# Patient Record
Sex: FEMALE | Race: WHITE | NOT HISPANIC OR LATINO | Employment: OTHER | URBAN - METROPOLITAN AREA
[De-identification: names, ages, dates, MRNs, and addresses within clinical notes are randomized per-mention and may not be internally consistent; named-entity substitution may affect disease eponyms.]

---

## 2023-11-27 ENCOUNTER — OFFICE VISIT (OUTPATIENT)
Dept: FAMILY MEDICINE | Facility: CLINIC | Age: 78
End: 2023-11-27
Payer: COMMERCIAL

## 2023-11-27 VITALS
HEART RATE: 74 BPM | BODY MASS INDEX: 33.74 KG/M2 | HEIGHT: 67 IN | RESPIRATION RATE: 16 BRPM | OXYGEN SATURATION: 97 % | WEIGHT: 215 LBS | TEMPERATURE: 97.6 F | SYSTOLIC BLOOD PRESSURE: 123 MMHG | DIASTOLIC BLOOD PRESSURE: 75 MMHG

## 2023-11-27 DIAGNOSIS — R32 URINARY INCONTINENCE, UNSPECIFIED TYPE: ICD-10-CM

## 2023-11-27 DIAGNOSIS — R41.3 MEMORY CHANGE: ICD-10-CM

## 2023-11-27 DIAGNOSIS — E03.9 HYPOTHYROIDISM, UNSPECIFIED TYPE: Primary | ICD-10-CM

## 2023-11-27 LAB
T4 FREE SERPL-MCNC: 1.12 NG/DL (ref 0.9–1.7)
TSH SERPL DL<=0.005 MIU/L-ACNC: 11 UIU/ML (ref 0.3–4.2)

## 2023-11-27 PROCEDURE — 36415 COLL VENOUS BLD VENIPUNCTURE: CPT | Performed by: PHYSICIAN ASSISTANT

## 2023-11-27 PROCEDURE — 84439 ASSAY OF FREE THYROXINE: CPT | Performed by: PHYSICIAN ASSISTANT

## 2023-11-27 PROCEDURE — 84443 ASSAY THYROID STIM HORMONE: CPT | Performed by: PHYSICIAN ASSISTANT

## 2023-11-27 PROCEDURE — 99203 OFFICE O/P NEW LOW 30 MIN: CPT | Performed by: PHYSICIAN ASSISTANT

## 2023-11-27 RX ORDER — PREDNISOLONE ACETATE 10 MG/ML
1 SUSPENSION/ DROPS OPHTHALMIC DAILY
COMMUNITY
Start: 2023-05-31

## 2023-11-27 RX ORDER — LEVOTHYROXINE SODIUM 112 UG/1
112 TABLET ORAL DAILY
COMMUNITY
Start: 2023-08-08 | End: 2023-11-27

## 2023-11-27 RX ORDER — LEVOTHYROXINE SODIUM 112 UG/1
112 TABLET ORAL DAILY
Qty: 90 TABLET | Refills: 0 | Status: SHIPPED | OUTPATIENT
Start: 2023-11-27 | End: 2024-01-10

## 2023-11-27 RX ORDER — CYCLOSPORINE 0.5 MG/ML
1 EMULSION OPHTHALMIC 3 TIMES DAILY
COMMUNITY
Start: 2023-06-12

## 2023-11-27 RX ORDER — HYDROXYZINE HYDROCHLORIDE 10 MG/1
10 TABLET, FILM COATED ORAL EVERY 6 HOURS PRN
COMMUNITY
Start: 2022-07-11 | End: 2024-01-10

## 2023-11-27 ASSESSMENT — PATIENT HEALTH QUESTIONNAIRE - PHQ9
SUM OF ALL RESPONSES TO PHQ QUESTIONS 1-9: 3
10. IF YOU CHECKED OFF ANY PROBLEMS, HOW DIFFICULT HAVE THESE PROBLEMS MADE IT FOR YOU TO DO YOUR WORK, TAKE CARE OF THINGS AT HOME, OR GET ALONG WITH OTHER PEOPLE: NOT DIFFICULT AT ALL
SUM OF ALL RESPONSES TO PHQ QUESTIONS 1-9: 3

## 2023-11-27 ASSESSMENT — PAIN SCALES - GENERAL: PAINLEVEL: NO PAIN (0)

## 2023-11-27 NOTE — PROGRESS NOTES
"  Assessment & Plan     (E03.9) Hypothyroidism, unspecified type  (primary encounter diagnosis)  Comment: History of hypothyroidism.  No recent TSH levels checked within the last year.  Refills of levothyroxine provided.  Discussed potential medication titration based off results.  Plan: TSH with free T4 reflex, levothyroxine         (SYNTHROID/LEVOTHROID) 112 MCG tablet            (R41.3) Memory change  Comment: Has had memory change over the last year.  Mini cog score 1/5 here.  Discussed with patient following up with neurology.  Patient's sister notes increased change over the last year.  Patient does endorse difficulties with short-term memory.  Plan: Adult Neurology  Referral          (R32) Urinary incontinence, unspecified type  Comment: Urinary incontinence issues.  Uncertain duration of symptoms.  Complicated by the patient's short-term memory impairment.  Discussed with patient trial of exercises over the next month or so as well as referral for urology.  Patient has urinary urgency and is unable to get to the restroom before she has complete bladder emptying.  Denies any other urinary symptoms with this.   Plan: Adult Urology  Referral             BMI:   Estimated body mass index is 33.67 kg/m  as calculated from the following:    Height as of this encounter: 1.702 m (5' 7\").    Weight as of this encounter: 97.5 kg (215 lb).   Jeet Mcmahon PA-C  [facility]    Dat Jacinto is a 78 year old, presenting for the following health issues:  Establish Care and Recheck Medication    History of Present Illness       Reason for visit:  General informatio jjupdate    She eats 2-3 servings of fruits and vegetables daily.She consumes 2 sweetened beverage(s) daily.She exercises with enough effort to increase her heart rate 9 or less minutes per day.  She exercises with enough effort to increase her heart rate 3 or less days per week.   She is taking medications " "regularly.     Previously followed at [facility].  History of hypothyroidism.  On levothyroxine 112 mcg.  Denies any missed doses of this.      Did have [employer] evaluation in home and noted to have a 1 out of 5 mini cog screening.  Patient had repeat screening here with once again 1 out of 5 testing.  Patient with her sister.  Notes issues of short-term memory impairment that have been worsening since March to April of this year.      Mini-Cog Assessment:       Mini-Cog Assessment Score: 1/5.    History of urinary incontinence that has been going on for multiple years.  Multiple prior referrals for urology.  Patient is uncertain if she did follow-up with urology.  Does have urinary urgency with complete bladder emptying.  This happens twice per day as well as overnight.  Patient denies any dysuria or hematuria.  Denies any vaginal dryness.      Review of Systems   Constitutional, HEENT, cardiovascular, pulmonary, gi and gu systems are negative, except as otherwise noted.      Objective    /75   Pulse 74   Temp 97.6  F (36.4  C) (Tympanic)   Resp 16   Ht 1.702 m (5' 7\")   Wt 97.5 kg (215 lb)   SpO2 97%   BMI 33.67 kg/m    Body mass index is 33.67 kg/m .  Physical Exam   GENERAL: healthy, alert and no distress  NECK: no adenopathy, no asymmetry, masses, or scars and thyroid normal to palpation  RESP: lungs clear to auscultation - no rales, rhonchi or wheezes  CV: regular rate and rhythm, normal S1 S2, no S3 or S4, no murmur, click or rub, no peripheral edema and peripheral pulses strong  MS: no gross musculoskeletal defects noted, no edema                      " [General Appearance - Well Developed] : well developed [General Appearance - Well Nourished] : well nourished [Bowel Sounds] : normal bowel sounds [Abdomen Soft] : soft [Skin Color & Pigmentation] : normal skin color and pigmentation [Skin Turgor] : supple [Heart Rate And Rhythm] : Heart rate and rhythm were normal [] : no respiratory distress [Respiration, Rhythm And Depth] : normal respiratory rhythm and effort [Oriented To Time, Place, And Person] : oriented to person, place, and time [Not Anxious] : not anxious [Normal Station and Gait] : the gait and station were normal for the patient's age [No Focal Deficits] : no focal deficits

## 2023-11-27 NOTE — LETTER
November 30, 2023      Agatataj Spangler  [id number] [address] [facility] [id number]        Dear Ms. Spangler,     Your TSH is elevated.     Please continue with your levothyroxine and I will place a future order to recheck this in the next few weeks. We may need to increase your medication dose.     Please schedule a lab only visit after 12/14/2023 to recheck your thyroid level.     If any new concerns please reach out to the clinic.     Resulted Orders   TSH with free T4 reflex   Result Value Ref Range    TSH 11.00 (H) 0.30 - 4.20 uIU/mL   T4 free   Result Value Ref Range    Free T4 1.12 0.90 - 1.70 ng/dL       If you have any questions or concerns, please call the clinic at the number listed above.       Sincerely,      Jeet Mcmahon PA-C

## 2023-12-21 ENCOUNTER — TELEPHONE (OUTPATIENT)
Dept: FAMILY MEDICINE | Facility: CLINIC | Age: 78
End: 2023-12-21
Payer: COMMERCIAL

## 2023-12-21 NOTE — TELEPHONE ENCOUNTER
Called patient to triage Covid symptoms, and offer Covid treatment if a higher level of care is not needed.    1. When did the COVID-19 symptoms start? 12/21/23  2. What is your worst symptom? (e.g., cough, fever, shortness of breath, muscle aches) cough  3. Do you have a cough? Yes, dry cough  4. Do you have a fever? 99 temps  5. Describe your breathing? (e.g., shortness of breath, wheezing, unable to speak) No  6. Are you getting better, staying the same or getting worse compared to yesterday?  If getting worse, in what way? Staying the same  7. Do you have any chronic medical problems? (e.g., asthma, heart or lung disease, weak immune system, obesity, etc.) No  8. Is there any chance you are pregnant? When was your last menstrual period? NA  9. Do you have any other symptoms?  (e.g., chills, fatigue, headache, loss of smell or taste, muscle pain, sore throat) sore throat, and cough  10. Do you use an oxygen saturation monitor (pulse oximeter) at home? What is your reading (oxygen level) today? What is your usual oxygen saturation reading? (e.g., 95%) NA    RN COVID TREATMENT VISIT  12/21/23      The patient has been triaged and does not require a higher level of care.    Ophelia Spangler  78 year old  Current weight? 215 lbs    Has the patient been seen by a primary care provider at an [facility] or [facility] within the past two years? Yes.   Have you been in close proximity to/do you have a known exposure to a person with a confirmed case of influenza? No.     General treatment eligibility:  Date of positive COVID test (PCR or at home)?  12/21/23    Are you or have you been hospitalized for this COVID-19 infection? No.   Have you received monoclonal antibodies or antiviral treatment for COVID-19 since this positive test? No.   Do you have any of the following conditions that place you at risk of being very sick from COVID-19?   - Age 50 years or older  Yes, patient has at  least one high risk condition as noted above.     Current COVID symptoms:   - cough  - sore throat  Yes. Patient has at least one symptom as selected.     How many days since symptoms started? 5 days or less. Established patient, 12 years or older weighing at least 88.2 lbs, who has symptoms that started in the past 5 days, has not been hospitalized nor received treatment already, and is at risk for being very sick from COVID-19.     Treatment eligibility by RN:  Are you currently pregnant or nursing? No  Do you have a clinically significant hypersensitivity to nirmatrelvir or ritonavir, or toxic epidermal necrolysis (TEN) or De Leon-Ravindra Syndrome? No  Do you have a history of hepatitis, any hepatic impairment on the Problem List (such as Child-Robertson Class C, cirrhosis, fatty liver disease, alcoholic liver disease), or was the last liver lab (hepatic panel, ALT, AST, ALK Phos, bilirubin) elevated in the past 6 months? YES  Do you have any history of severe renal impairment (eGFR < 30mL/min)? YES    Is patient eligible to continue? No, patient does not meet all eligibility requirements for the RN COVID treatment (as denoted by yes response(s) above). Patient informed they will need a virtual provider visit to assess treatment options.  Patient will be transferred to a  at the end of this call.   Caitlyn Lovelace RN

## 2023-12-21 NOTE — TELEPHONE ENCOUNTER
COVID Positive/Requesting COVID treatment    Patient is positive for COVID and requesting treatment options.    Date of positive COVID test (PCR or at home)? 12/21/23  Current COVID symptoms: fever or chills, cough, and sore throat  Date COVID symptoms began: 12/21/23    Message should be routed to clinic RN pool. Best phone number to use for call back: [phone]

## 2024-01-10 ENCOUNTER — OFFICE VISIT (OUTPATIENT)
Dept: FAMILY MEDICINE | Facility: CLINIC | Age: 79
End: 2024-01-10
Payer: COMMERCIAL

## 2024-01-10 VITALS
HEIGHT: 67 IN | TEMPERATURE: 97.4 F | WEIGHT: 206.6 LBS | SYSTOLIC BLOOD PRESSURE: 124 MMHG | HEART RATE: 73 BPM | OXYGEN SATURATION: 96 % | DIASTOLIC BLOOD PRESSURE: 74 MMHG | RESPIRATION RATE: 16 BRPM | BODY MASS INDEX: 32.43 KG/M2

## 2024-01-10 DIAGNOSIS — R41.3 MEMORY CHANGE: ICD-10-CM

## 2024-01-10 DIAGNOSIS — N32.81 OVERACTIVE BLADDER: ICD-10-CM

## 2024-01-10 DIAGNOSIS — E03.9 HYPOTHYROIDISM, UNSPECIFIED TYPE: ICD-10-CM

## 2024-01-10 DIAGNOSIS — Z13.1 SCREENING FOR DIABETES MELLITUS: ICD-10-CM

## 2024-01-10 DIAGNOSIS — E78.00 ELEVATED LDL CHOLESTEROL LEVEL: ICD-10-CM

## 2024-01-10 DIAGNOSIS — E66.01 MORBID (SEVERE) OBESITY DUE TO EXCESS CALORIES (H): ICD-10-CM

## 2024-01-10 DIAGNOSIS — Z00.00 ENCOUNTER FOR MEDICARE ANNUAL WELLNESS EXAM: Primary | ICD-10-CM

## 2024-01-10 DIAGNOSIS — Z13.220 LIPID SCREENING: ICD-10-CM

## 2024-01-10 LAB
ALBUMIN UR-MCNC: NEGATIVE MG/DL
APPEARANCE UR: CLEAR
BASOPHILS # BLD AUTO: 0 10E3/UL (ref 0–0.2)
BASOPHILS NFR BLD AUTO: 0 %
BILIRUB UR QL STRIP: NEGATIVE
COLOR UR AUTO: YELLOW
EOSINOPHIL # BLD AUTO: 0.1 10E3/UL (ref 0–0.7)
EOSINOPHIL NFR BLD AUTO: 2 %
ERYTHROCYTE [DISTWIDTH] IN BLOOD BY AUTOMATED COUNT: 13 % (ref 10–15)
GLUCOSE UR STRIP-MCNC: NEGATIVE MG/DL
HBA1C MFR BLD: 5.4 % (ref 0–5.6)
HCT VFR BLD AUTO: 44.6 % (ref 35–47)
HGB BLD-MCNC: 14.3 G/DL (ref 11.7–15.7)
HGB UR QL STRIP: NEGATIVE
IMM GRANULOCYTES # BLD: 0 10E3/UL
IMM GRANULOCYTES NFR BLD: 0 %
KETONES UR STRIP-MCNC: NEGATIVE MG/DL
LEUKOCYTE ESTERASE UR QL STRIP: NEGATIVE
LYMPHOCYTES # BLD AUTO: 2.1 10E3/UL (ref 0.8–5.3)
LYMPHOCYTES NFR BLD AUTO: 28 %
MCH RBC QN AUTO: 28.8 PG (ref 26.5–33)
MCHC RBC AUTO-ENTMCNC: 32.1 G/DL (ref 31.5–36.5)
MCV RBC AUTO: 90 FL (ref 78–100)
MONOCYTES # BLD AUTO: 0.6 10E3/UL (ref 0–1.3)
MONOCYTES NFR BLD AUTO: 8 %
NEUTROPHILS # BLD AUTO: 4.7 10E3/UL (ref 1.6–8.3)
NEUTROPHILS NFR BLD AUTO: 63 %
NITRATE UR QL: NEGATIVE
PH UR STRIP: 6 [PH] (ref 5–7)
PLATELET # BLD AUTO: 234 10E3/UL (ref 150–450)
RBC # BLD AUTO: 4.96 10E6/UL (ref 3.8–5.2)
SP GR UR STRIP: 1.02 (ref 1–1.03)
UROBILINOGEN UR STRIP-ACNC: 2 E.U./DL
WBC # BLD AUTO: 7.6 10E3/UL (ref 4–11)

## 2024-01-10 PROCEDURE — 84443 ASSAY THYROID STIM HORMONE: CPT | Performed by: PHYSICIAN ASSISTANT

## 2024-01-10 PROCEDURE — 80053 COMPREHEN METABOLIC PANEL: CPT | Performed by: PHYSICIAN ASSISTANT

## 2024-01-10 PROCEDURE — 99213 OFFICE O/P EST LOW 20 MIN: CPT | Mod: 25 | Performed by: PHYSICIAN ASSISTANT

## 2024-01-10 PROCEDURE — 36415 COLL VENOUS BLD VENIPUNCTURE: CPT | Performed by: PHYSICIAN ASSISTANT

## 2024-01-10 PROCEDURE — G0402 INITIAL PREVENTIVE EXAM: HCPCS | Performed by: PHYSICIAN ASSISTANT

## 2024-01-10 PROCEDURE — 85025 COMPLETE CBC W/AUTO DIFF WBC: CPT | Performed by: PHYSICIAN ASSISTANT

## 2024-01-10 PROCEDURE — 83036 HEMOGLOBIN GLYCOSYLATED A1C: CPT | Performed by: PHYSICIAN ASSISTANT

## 2024-01-10 PROCEDURE — 80061 LIPID PANEL: CPT | Performed by: PHYSICIAN ASSISTANT

## 2024-01-10 PROCEDURE — 81003 URINALYSIS AUTO W/O SCOPE: CPT | Performed by: PHYSICIAN ASSISTANT

## 2024-01-10 RX ORDER — LEVOTHYROXINE SODIUM 112 UG/1
112 TABLET ORAL DAILY
Qty: 90 TABLET | Refills: 0 | Status: SHIPPED | OUTPATIENT
Start: 2024-01-10 | End: 2024-06-14

## 2024-01-10 RX ORDER — MIRABEGRON 25 MG/1
25 TABLET, FILM COATED, EXTENDED RELEASE ORAL DAILY
Qty: 30 TABLET | Refills: 0 | Status: SHIPPED | OUTPATIENT
Start: 2024-01-10

## 2024-01-10 ASSESSMENT — ENCOUNTER SYMPTOMS
JOINT SWELLING: 0
NAUSEA: 0
SORE THROAT: 0
CONSTIPATION: 0
HEADACHES: 0
EYE PAIN: 0
BREAST MASS: 0
HEMATOCHEZIA: 0
HEARTBURN: 0
SHORTNESS OF BREATH: 0
DYSURIA: 0
PARESTHESIAS: 0
FEVER: 0
ABDOMINAL PAIN: 0
COUGH: 0
CHILLS: 0
WEAKNESS: 0
MYALGIAS: 0
HEMATURIA: 0
DIARRHEA: 0
FREQUENCY: 0
PALPITATIONS: 0
ARTHRALGIAS: 1
DIZZINESS: 0
NERVOUS/ANXIOUS: 0

## 2024-01-10 ASSESSMENT — ACTIVITIES OF DAILY LIVING (ADL): CURRENT_FUNCTION: NO ASSISTANCE NEEDED

## 2024-01-10 ASSESSMENT — PAIN SCALES - GENERAL: PAINLEVEL: NO PAIN (0)

## 2024-01-10 NOTE — PATIENT INSTRUCTIONS
Patient Education   Personalized Prevention Plan  You are due for the preventive services outlined below.  Your care team is available to assist you in scheduling these services.  If you have already completed any of these items, please share that information with your care team to update in your medical record.  Health Maintenance Due   Topic Date Due     Osteoporosis Screening  Never done     ANNUAL REVIEW OF HM ORDERS  Never done     Discuss Advance Care Planning  Never done     Hepatitis C Screening  Never done     Cholesterol Lab  Never done     RSV VACCINE (Pregnancy & 60+) (1 - 1-dose 60+ series) Never done     Annual Wellness Visit  07/07/2023     COVID-19 Vaccine (4 - 2023-24 season) 09/01/2023

## 2024-01-10 NOTE — PROGRESS NOTES
"SUBJECTIVE:   Ophelia is a 78 year old, presenting for the following:  Physical      Are you in the first 12 months of your Medicare coverage?  Yes,  Visual Acuity:  Right Eye: 20/40   Left Eye: 20/40  Both Eyes: 20/40    Healthy Habits:     In general, how would you rate your overall health?  Good    Frequency of exercise:  None    Do you usually eat at least 4 servings of fruit and vegetables a day, include whole grains    & fiber and avoid regularly eating high fat or \"junk\" foods?  Yes    Taking medications regularly:  Yes    Medication side effects:  None    Ability to successfully perform activities of daily living:  No assistance needed    Home Safety:  No safety concerns identified    Hearing Impairment:  Need to ask people to speak up or repeat themselves    In the past 6 months, have you been bothered by leaking of urine? Yes    In general, how would you rate your overall mental or emotional health?  Good    Additional concerns today:  No    Urinary symptoms over the last few months to years.  Chart review shows appointments with OB/gynecology dating back to 2018 for overactive bladder.  Patient had allergic reaction with oxybutynin.  Has been trialed on mirabegron, however, patient is not sure how well she tolerated this medication or if there was improvement.    Grandparent with dementia.  No other family history of dementia.  Has had more issues with memory impairment.    Today's PHQ-2 Score:       1/10/2024     1:24 PM   PHQ-2 ( 1999 [employer])   Q1: Little interest or pleasure in doing things 0   Q2: Feeling down, depressed or hopeless 0   PHQ-2 Score 0           Have you ever done Advance Care Planning? (For example, a Health Directive, POLST, or a discussion with a medical provider or your loved ones about your wishes): No, advance care planning information given to patient to review.  Patient plans to discuss their wishes with loved ones or provider.         Fall risk  Fallen 2 or more times in the past " year?: No  Any fall with injury in the past year?: No    Patient with concerns for cognitive impairment.  Referral placed for occupational therapy for further assessment of      Reviewed and updated as needed this visit by clinical staff   Tobacco  Allergies  Meds              Reviewed and updated as needed this visit by Provider                 Social History     Tobacco Use    Smoking status: Never    Smokeless tobacco: Never   Substance Use Topics    Alcohol use: Not on file             1/10/2024     1:22 PM   Alcohol Use   Prescreen: >3 drinks/day or >7 drinks/week? No     Do you have a current opioid prescription? No  Do you use any other controlled substances or medications that are not prescribed by a provider? None              Current providers sharing in care for this patient include:   Patient Care Team:  Jeet Mcmahon PA-C as PCP - General (Physician Assistant - Medical)  Jeet Mcmahon PA-C as Assigned PCP  Jeet Mcmahon PA-C as Physician Assistant (Physician Assistant - Medical)  Hosea Dyer MD as MD (Urology)    The following health maintenance items are reviewed in Epic and correct as of today:  Health Maintenance   Topic Date Due    DEXA  Never done    ANNUAL REVIEW OF HM ORDERS  Never done    ADVANCE CARE PLANNING  Never done    HEPATITIS C SCREENING  Never done    LIPID  Never done    RSV VACCINE (Pregnancy & 60+) (1 - 1-dose 60+ series) Never done    MEDICARE ANNUAL WELLNESS VISIT  07/07/2023    COVID-19 Vaccine (4 - 2023-24 season) 09/01/2023    TSH W/FREE T4 REFLEX  11/27/2024    FALL RISK ASSESSMENT  01/10/2025    DTAP/TDAP/TD IMMUNIZATION (2 - Td or Tdap) 12/21/2027    PHQ-2 (once per calendar year)  Completed    INFLUENZA VACCINE  Completed    Pneumococcal Vaccine: 65+ Years  Completed    ZOSTER IMMUNIZATION  Completed    IPV IMMUNIZATION  Aged Out    HPV IMMUNIZATION  Aged Out    MENINGITIS IMMUNIZATION  Aged Out    RSV MONOCLONAL ANTIBODY  Aged Out  "    Mammogram Screening: Mammogram Screening - Patient over age 75, has elected to discontinue screenings.  Pertinent mammograms are reviewed under the imaging tab.    Review of Systems   Constitutional:  Negative for chills and fever.   HENT:  Positive for hearing loss. Negative for congestion, ear pain and sore throat.    Eyes:  Negative for pain and visual disturbance.   Respiratory:  Negative for cough and shortness of breath.    Cardiovascular:  Negative for chest pain, palpitations and peripheral edema.   Gastrointestinal:  Negative for abdominal pain, constipation, diarrhea, heartburn, hematochezia and nausea.   Breasts:  Negative for tenderness, breast mass and discharge.   Genitourinary:  Positive for urgency. Negative for dysuria, frequency, genital sores, hematuria, pelvic pain, vaginal bleeding and vaginal discharge.   Musculoskeletal:  Positive for arthralgias. Negative for joint swelling and myalgias.   Skin:  Negative for rash.   Neurological:  Negative for dizziness, weakness, headaches and paresthesias.   Psychiatric/Behavioral:  Negative for mood changes. The patient is not nervous/anxious.      OBJECTIVE:   /74   Pulse 73   Temp 97.4  F (36.3  C) (Tympanic)   Resp 16   Ht 1.702 m (5' 7\")   Wt 93.7 kg (206 lb 9.6 oz)   SpO2 96%   BMI 32.36 kg/m   Estimated body mass index is 32.36 kg/m  as calculated from the following:    Height as of this encounter: 1.702 m (5' 7\").    Weight as of this encounter: 93.7 kg (206 lb 9.6 oz).  Physical Exam  GENERAL: Elevated BMI alert and no distress  EYES: Eyes grossly normal to inspection, PERRL and conjunctivae and sclerae normal  HENT: ear canals and TM's normal, nose and mouth without ulcers or lesions  NECK: no adenopathy, no asymmetry, masses, or scars and thyroid normal to palpation  RESP: lungs clear to auscultation - no rales, rhonchi or wheezes  CV: regular rate and rhythm, normal S1 S2, no S3 or S4, no murmur, click or rub, no peripheral " edema and peripheral pulses strong  ABDOMEN: soft, nontender, no hepatosplenomegaly, no masses and bowel sounds normal  MS: no gross musculoskeletal defects noted, no edema  SKIN: no suspicious lesions or rashes  NEURO: Normal strength and tone, mentation intact and speech normal  PSYCH: mentation appears normal, affect normal/bright        ASSESSMENT / PLAN:   (Z00.00) Encounter for Medicare annual wellness exam  (primary encounter diagnosis)  Comment: Encounter for Medicare annual wellness.  Plan: CBC with platelets and differential          (R41.3) Memory change  Comment: Memory change.  Discussed with patient and sister occupational therapy referral.  Plan: Occupational Therapy Referral          (E03.9) Hypothyroidism, unspecified type  Comment: Hypothyroidism.  Prior TSH slightly elevated, however, normal T4.  Will recheck.  Refills of Synthroid 112 mcg.  Plan: CBC with platelets and differential, TSH with         free T4 reflex, levothyroxine         (SYNTHROID/LEVOTHROID) 112 MCG tablet          (N32.81) Overactive bladder  Comment: History of overactive bladder.  Previously on mirabegron years ago, however, patient is uncertain of efficacy of this medication.  Will trial this for the next 30 days.  Patient has urology referral.  Also discussed potential physical therapy for pelvic floor.  Plan: UA Macroscopic with reflex to Microscopic and         Culture - Lab Collect, Comprehensive metabolic         panel (BMP + Alb, Alk Phos, ALT, AST, Total.         Bili, TP), Physical Therapy Referral,         mirabegron (MYRBETRIQ) 25 MG 24 hr tablet,         Incontinence Supplies Order for DME - ONLY FOR         DME          (E66.01) Morbid (severe) obesity due to excess calories (H)  Comment:   Plan:     (Z13.220) Lipid screening  Comment:   Plan: Lipid panel reflex to direct LDL Non-fasting            (Z13.1) Screening for diabetes mellitus  Comment:   Plan: Hemoglobin A1c            Patient has been advised of  "split billing requirements and indicates understanding: Yes      COUNSELING:  Reviewed preventive health counseling, as reflected in patient instructions       Regular exercise       Healthy diet/nutrition       Vision screening       Dental care      BMI:   Estimated body mass index is 32.36 kg/m  as calculated from the following:    Height as of this encounter: 1.702 m (5' 7\").    Weight as of this encounter: 93.7 kg (206 lb 9.6 oz).   Weight management plan: Discussed healthy diet and exercise guidelines      She reports that she has never smoked. She has never used smokeless tobacco.      Appropriate preventive services were discussed with this patient, including applicable screening as appropriate for fall prevention, nutrition, physical activity, Tobacco-use cessation, weight loss and cognition.  Checklist reviewing preventive services available has been given to the patient.    Reviewed patients plan of care and provided an AVS. The Basic Care Plan (routine screening as documented in Health Maintenance) for Ophelia meets the Care Plan requirement. This Care Plan has been established and reviewed with the Patient and sister.          Jeet Mcmahon PA-C  [facility]    Identified Health Risks:    "

## 2024-01-10 NOTE — LETTER
January 12, 2024      Ophelia Spangler  [address] [facility] [address]        Ms. Spangler,     Urine without evidence of infection.     Normal Thyroid function study.     Normal kidney and liver function studies.     Normal hemoglobin A1c without evidence of diabetes.     Normal hemoglobin without evidence of anemia.     Your LDL cholesterol as well as triglycerides were slightly elevated.     Could recheck fasting labs with a lab only visit.  Please schedule a lab only visit and fast for 8 hours prior to labs.  If cholesterol remains elevated possibility of starting statin medication.  The statin medications help reduce the risk of having a cardiovascular event related to the cholesterol.  Can be associated with muscle aches.     I have placed a future order to recheck cholesterol.     If any further questions or concerns please reach out to the clinic.     Sincerely,   Jeet Mcmahon PA-C     Resulted Orders   TSH with free T4 reflex   Result Value Ref Range    TSH 2.32 0.30 - 4.20 uIU/mL   Lipid panel reflex to direct LDL Non-fasting   Result Value Ref Range    Cholesterol 227 (H) <200 mg/dL    Triglycerides 167 (H) <150 mg/dL    Direct Measure HDL 43 (L) >=50 mg/dL    LDL Cholesterol Calculated 151 (H) <=100 mg/dL    Non HDL Cholesterol 184 (H) <130 mg/dL    Patient Fasting > 8hrs? No     Narrative    Cholesterol  Desirable:  <200 mg/dL    Triglycerides  Normal:  Less than 150 mg/dL  Borderline High:  150-199 mg/dL  High:  200-499 mg/dL  Very High:  Greater than or equal to 500 mg/dL    Direct Measure HDL  Female:  Greater than or equal to 50 mg/dL   Male:  Greater than or equal to 40 mg/dL    LDL Cholesterol  Desirable:  <100mg/dL  Above Desirable:  100-129 mg/dL   Borderline High:  130-159 mg/dL   High:  160-189 mg/dL   Very High:  >= 190 mg/dL    Non HDL Cholesterol  Desirable:  130 mg/dL  Above Desirable:  130-159 mg/dL  Borderline High:  160-189 mg/dL  High:  190-219 mg/dL  Very High:   Greater than or equal to 220 mg/dL   UA Macroscopic with reflex to Microscopic and Culture - Lab Collect   Result Value Ref Range    Color Urine Yellow Colorless, Straw, Light Yellow, Yellow    Appearance Urine Clear Clear    Glucose Urine Negative Negative mg/dL    Bilirubin Urine Negative Negative    Ketones Urine Negative Negative mg/dL    Specific Gravity Urine 1.025 1.003 - 1.035    Blood Urine Negative Negative    pH Urine 6.0 5.0 - 7.0    Protein Albumin Urine Negative Negative mg/dL    Urobilinogen Urine 2.0 (A) 0.2, 1.0 E.U./dL    Nitrite Urine Negative Negative    Leukocyte Esterase Urine Negative Negative    Narrative    Microscopic not indicated   Comprehensive metabolic panel (BMP + Alb, Alk Phos, ALT, AST, Total. Bili, TP)   Result Value Ref Range    Sodium 140 135 - 145 mmol/L      Comment:      Reference intervals for this test were updated on 09/26/2023 to more accurately reflect our healthy population. There may be differences in the flagging of prior results with similar values performed with this method. Interpretation of those prior results can be made in the context of the updated reference intervals.     Potassium 4.4 3.4 - 5.3 mmol/L    Carbon Dioxide (CO2) 26 22 - 29 mmol/L    Anion Gap 10 7 - 15 mmol/L    Urea Nitrogen 15.2 8.0 - 23.0 mg/dL    Creatinine 0.69 0.51 - 0.95 mg/dL    GFR Estimate 88 >60 mL/min/1.73m2    Calcium 10.1 8.8 - 10.2 mg/dL    Chloride 104 98 - 107 mmol/L    Glucose 91 70 - 99 mg/dL    Alkaline Phosphatase 103 40 - 150 U/L      Comment:      Reference intervals for this test were updated on 11/14/2023 to more accurately reflect our healthy population. There may be differences in the flagging of prior results with similar values performed with this method. Interpretation of those prior results can be made in the context of the updated reference intervals.    AST 19 0 - 45 U/L      Comment:      Reference intervals for this test were updated on 6/12/2023 to more  accurately reflect our healthy population. There may be differences in the flagging of prior results with similar values performed with this method. Interpretation of those prior results can be made in the context of the updated reference intervals.    ALT 14 0 - 50 U/L      Comment:      Reference intervals for this test were updated on 6/12/2023 to more accurately reflect our healthy population. There may be differences in the flagging of prior results with similar values performed with this method. Interpretation of those prior results can be made in the context of the updated reference intervals.      Protein Total 7.4 6.4 - 8.3 g/dL    Albumin 4.1 3.5 - 5.2 g/dL    Bilirubin Total 0.3 <=1.2 mg/dL   Hemoglobin A1c   Result Value Ref Range    Hemoglobin A1C 5.4 0.0 - 5.6 %      Comment:      Normal <5.7%   Prediabetes 5.7-6.4%    Diabetes 6.5% or higher     Note: Adopted from ADA consensus guidelines.   CBC with platelets and differential   Result Value Ref Range    WBC Count 7.6 4.0 - 11.0 10e3/uL    RBC Count 4.96 3.80 - 5.20 10e6/uL    Hemoglobin 14.3 11.7 - 15.7 g/dL    Hematocrit 44.6 35.0 - 47.0 %    MCV 90 78 - 100 fL    MCH 28.8 26.5 - 33.0 pg    MCHC 32.1 31.5 - 36.5 g/dL    RDW 13.0 10.0 - 15.0 %    Platelet Count 234 150 - 450 10e3/uL    % Neutrophils 63 %    % Lymphocytes 28 %    % Monocytes 8 %    % Eosinophils 2 %    % Basophils 0 %    % Immature Granulocytes 0 %    Absolute Neutrophils 4.7 1.6 - 8.3 10e3/uL    Absolute Lymphocytes 2.1 0.8 - 5.3 10e3/uL    Absolute Monocytes 0.6 0.0 - 1.3 10e3/uL    Absolute Eosinophils 0.1 0.0 - 0.7 10e3/uL    Absolute Basophils 0.0 0.0 - 0.2 10e3/uL    Absolute Immature Granulocytes 0.0 <=0.4 10e3/uL

## 2024-01-11 LAB
ALBUMIN SERPL BCG-MCNC: 4.1 G/DL (ref 3.5–5.2)
ALP SERPL-CCNC: 103 U/L (ref 40–150)
ALT SERPL W P-5'-P-CCNC: 14 U/L (ref 0–50)
ANION GAP SERPL CALCULATED.3IONS-SCNC: 10 MMOL/L (ref 7–15)
AST SERPL W P-5'-P-CCNC: 19 U/L (ref 0–45)
BILIRUB SERPL-MCNC: 0.3 MG/DL
BUN SERPL-MCNC: 15.2 MG/DL (ref 8–23)
CALCIUM SERPL-MCNC: 10.1 MG/DL (ref 8.8–10.2)
CHLORIDE SERPL-SCNC: 104 MMOL/L (ref 98–107)
CHOLEST SERPL-MCNC: 227 MG/DL
CREAT SERPL-MCNC: 0.69 MG/DL (ref 0.51–0.95)
DEPRECATED HCO3 PLAS-SCNC: 26 MMOL/L (ref 22–29)
EGFRCR SERPLBLD CKD-EPI 2021: 88 ML/MIN/1.73M2
FASTING STATUS PATIENT QL REPORTED: NO
GLUCOSE SERPL-MCNC: 91 MG/DL (ref 70–99)
HDLC SERPL-MCNC: 43 MG/DL
LDLC SERPL CALC-MCNC: 151 MG/DL
NONHDLC SERPL-MCNC: 184 MG/DL
POTASSIUM SERPL-SCNC: 4.4 MMOL/L (ref 3.4–5.3)
PROT SERPL-MCNC: 7.4 G/DL (ref 6.4–8.3)
SODIUM SERPL-SCNC: 140 MMOL/L (ref 135–145)
TRIGL SERPL-MCNC: 167 MG/DL
TSH SERPL DL<=0.005 MIU/L-ACNC: 2.32 UIU/ML (ref 0.3–4.2)

## 2024-01-12 NOTE — RESULT ENCOUNTER NOTE
Please send letter,     Ms. Spangler,     Urine without evidence of infection.     Normal Thyroid function study.     Normal kidney and liver function studies.     Normal hemoglobin A1c without evidence of diabetes.     Normal hemoglobin without evidence of anemia.     Your LDL cholesterol as well as triglycerides were slightly elevated.     Could recheck fasting labs with a lab only visit.  Please schedule a lab only visit and fast for 8 hours prior to labs.  If cholesterol remains elevated possibility of starting statin medication.  The statin medications help reduce the risk of having a cardiovascular event related to the cholesterol.  Can be associated with muscle aches.    I have placed a future order to recheck cholesterol.    If any further questions or concerns please reach out to the clinic.    Sincerely,  Jeet Mcmahon PA-C

## 2024-01-30 ENCOUNTER — OFFICE VISIT (OUTPATIENT)
Dept: UROLOGY | Facility: CLINIC | Age: 79
End: 2024-01-30
Payer: COMMERCIAL

## 2024-01-30 VITALS — HEART RATE: 64 BPM | SYSTOLIC BLOOD PRESSURE: 135 MMHG | DIASTOLIC BLOOD PRESSURE: 74 MMHG | OXYGEN SATURATION: 98 %

## 2024-01-30 DIAGNOSIS — R32 URINARY INCONTINENCE, UNSPECIFIED TYPE: ICD-10-CM

## 2024-01-30 PROCEDURE — 99203 OFFICE O/P NEW LOW 30 MIN: CPT | Performed by: UROLOGY

## 2024-01-30 RX ORDER — DONEPEZIL HYDROCHLORIDE 5 MG/1
1 TABLET, FILM COATED ORAL DAILY
COMMUNITY
Start: 2024-01-17

## 2024-01-30 NOTE — PROGRESS NOTES
Patient presents to the clinic today for urinary incontinence. Patient relays the following information: Pt is incontinent. Pt wears adult briefs (Monica). Unsure if Myrbetriq is helping.    Unable to obtain a UA    Average Daily intake:  None Coffee/tea  3 glasses Water  occassional Juice    Kallie RAMOS RN Urology 1/30/2024 1:10 PM

## 2024-01-30 NOTE — PROGRESS NOTES
S: Patient is a pleasant 78-year-old female who was requested to be seen by Dr. Jeet Mcmahon for consultation with regard to patient's new incontinence.  Patient has had problems with urine incontinence for a couple of years.  She has dementia.  History was obtained through her sister Adina.  She lives with her sister.  She was placed on Myrbetriq recently without much improvement.  Current Outpatient Medications   Medication Sig Dispense Refill    donepezil (ARICEPT) 5 MG tablet Take 1 tablet by mouth daily      levothyroxine (SYNTHROID/LEVOTHROID) 112 MCG tablet Take 1 tablet (112 mcg) by mouth daily 90 tablet 0    mirabegron (MYRBETRIQ) 25 MG 24 hr tablet Take 1 tablet (25 mg) by mouth daily 30 tablet 0    prednisoLONE acetate (PRED FORTE) 1 % ophthalmic suspension Place 1 drop into both eyes daily      RESTASIS 0.05 % ophthalmic emulsion Place 1 drop into both eyes 3 times daily       Allergies   Allergen Reactions    Sulfamethoxazole-Trimethoprim Anaphylaxis     Could be a reaction with the Cipro also.      Could be a reaction with the Cipro also.    Oxybutynin Swelling and Other (See Comments)     Comment: numbness around mouth, Description:    Sulfamethoxazole Other (See Comments)     Comment:  , Description:    Tolterodine Other (See Comments)     Blurred vision    Trimethoprim Other (See Comments)     Comment:  , Description:    Ciprofloxacin Itching, Other (See Comments) and Rash     Comment:  , Description:    Soap Rash     No past medical history on file.  No past surgical history on file.   No family history on file.  Social History     Socioeconomic History    Marital status: Single     Spouse name: None    Number of children: None    Years of education: None    Highest education level: None   Tobacco Use    Smoking status: Never    Smokeless tobacco: Never   Vaping Use    Vaping Use: Never used     Social Determinants of Health     Financial Resource Strain: Low Risk  (1/10/2024)    Financial  Resource Strain     Within the past 12 months, have you or your family members you live with been unable to get utilities (heat, electricity) when it was really needed?: No   Food Insecurity: Low Risk  (1/10/2024)    Food Insecurity     Within the past 12 months, did you worry that your food would run out before you got money to buy more?: No     Within the past 12 months, did the food you bought just not last and you didn t have money to get more?: No   Transportation Needs: Low Risk  (1/10/2024)    Transportation Needs     Within the past 12 months, has lack of transportation kept you from medical appointments, getting your medicines, non-medical meetings or appointments, work, or from getting things that you need?: No   Housing Stability: Low Risk  (1/10/2024)    Housing Stability     Do you have housing? : Yes     Are you worried about losing your housing?: No       REVIEW OF SYSTEMS  =================  C: NEGATIVE for fever, chills, change in weight  I: NEGATIVE for worrisome rashes, moles or lesions  E/M: NEGATIVE for ear, mouth and throat problems  R: NEGATIVE for significant cough or SHORTNESS OF BREATH  CV:  NEGATIVE for chest pain, palpitations or peripheral edema  GI: NEGATIVE for nausea, abdominal pain, heartburn, or change in bowel habits  NEURO: NEGATIVE numbness/weakness  : see HPI  PSYCH: NEGATIVE depression/anxiety  LYmph: no new enlarged lymph nodes  Ortho: no new trauma/movements      Physical Exam:  /74   Pulse 64   SpO2 98%    GENERAL: alert and no distress  EYES: Eyes grossly normal to inspection.  No discharge or erythema, or obvious scleral/conjunctival abnormalities.  RESP: No audible wheeze, cough, or visible cyanosis.    SKIN: Visible skin clear. No significant rash, abnormal pigmentation or lesions.  NEURO: Cranial nerves grossly intact.  Mentation and speech appropriate for age.  PSYCH: Appropriate affect, tone, and pace of words     Assessment/Plan:     Functional incontinence  due to the dementia: Not much can be done for this unfortunately.  She should stop Myrbetriq.  Discussed timed voiding with her sister today.

## 2024-06-14 DIAGNOSIS — E03.9 HYPOTHYROIDISM, UNSPECIFIED TYPE: ICD-10-CM

## 2024-06-14 RX ORDER — LEVOTHYROXINE SODIUM 112 UG/1
112 TABLET ORAL DAILY
Qty: 90 TABLET | Refills: 1 | Status: SHIPPED | OUTPATIENT
Start: 2024-06-14

## 2024-06-14 NOTE — TELEPHONE ENCOUNTER
Left message on answering machine for patient to call back to [phone].    RN please find out why gap from last refill and send to pcp.  Tiffany TILLEYN, RN       Oxytocin Safety Progress Check Note - Omari Hammond 29 y o  female MRN: [id number]    Unit/Bed#: -01 Encounter: [id number]    OB History    Para Term  AB Living   1 0 0 0 0 0   SAB TAB Ectopic Multiple Live Births   0 0 0 0 0     Gestational Age: 38w6d  Dose (maddie-units/min) Oxytocin: 4 maddie-units/min  Contraction Frequency (minutes): 2-3  Contraction Quality: Moderate  Tachysystole: No   Dilation: 4        Effacement (%): 90  Station: 0  Baseline Rate: 145 bpm  Fetal Heart Rate: 165 BPM  FHR Category: Category I     Oxytocin Safety Progress Check: Safety check completed    Notes/comments: Will begin amnioinfusion  Patient is now comfortable  FHT still Cat II will recurrent variables  Attending aware              Chayito Koenig MD 2019 2:49 AM

## 2024-06-14 NOTE — TELEPHONE ENCOUNTER
Pt sister called back. She states she thinks pt has been on this continuously.  When she has been asking pt is taking this and she has said yes.     Tiffany España BSN, RN

## 2024-10-25 ENCOUNTER — TELEPHONE (OUTPATIENT)
Dept: FAMILY MEDICINE | Facility: CLINIC | Age: 79
End: 2024-10-25

## 2024-10-25 ENCOUNTER — OFFICE VISIT (OUTPATIENT)
Dept: FAMILY MEDICINE | Facility: CLINIC | Age: 79
End: 2024-10-25
Payer: COMMERCIAL

## 2024-10-25 VITALS
BODY MASS INDEX: 32.96 KG/M2 | DIASTOLIC BLOOD PRESSURE: 75 MMHG | HEIGHT: 67 IN | WEIGHT: 210 LBS | HEART RATE: 67 BPM | SYSTOLIC BLOOD PRESSURE: 116 MMHG | RESPIRATION RATE: 16 BRPM | OXYGEN SATURATION: 98 % | TEMPERATURE: 97.6 F

## 2024-10-25 DIAGNOSIS — R53.83 OTHER FATIGUE: ICD-10-CM

## 2024-10-25 DIAGNOSIS — E03.9 HYPOTHYROIDISM, UNSPECIFIED TYPE: ICD-10-CM

## 2024-10-25 DIAGNOSIS — E66.01 MORBID (SEVERE) OBESITY DUE TO EXCESS CALORIES (H): ICD-10-CM

## 2024-10-25 DIAGNOSIS — R39.81 FUNCTIONAL INCONTINENCE: ICD-10-CM

## 2024-10-25 DIAGNOSIS — F03.A0 MILD DEMENTIA, UNSPECIFIED DEMENTIA TYPE, UNSPECIFIED WHETHER BEHAVIORAL, PSYCHOTIC, OR MOOD DISTURBANCE OR ANXIETY (H): Primary | ICD-10-CM

## 2024-10-25 DIAGNOSIS — Z13.220 LIPID SCREENING: ICD-10-CM

## 2024-10-25 LAB
ALBUMIN SERPL BCG-MCNC: 4 G/DL (ref 3.5–5.2)
ALP SERPL-CCNC: 86 U/L (ref 40–150)
ALT SERPL W P-5'-P-CCNC: 10 U/L (ref 0–50)
ANION GAP SERPL CALCULATED.3IONS-SCNC: 10 MMOL/L (ref 7–15)
AST SERPL W P-5'-P-CCNC: 22 U/L (ref 0–45)
BASOPHILS # BLD AUTO: 0 10E3/UL (ref 0–0.2)
BASOPHILS NFR BLD AUTO: 0 %
BILIRUB SERPL-MCNC: 0.3 MG/DL
BUN SERPL-MCNC: 16.5 MG/DL (ref 8–23)
CALCIUM SERPL-MCNC: 9.9 MG/DL (ref 8.8–10.4)
CHLORIDE SERPL-SCNC: 101 MMOL/L (ref 98–107)
CHOLEST SERPL-MCNC: 231 MG/DL
CREAT SERPL-MCNC: 0.95 MG/DL (ref 0.51–0.95)
EGFRCR SERPLBLD CKD-EPI 2021: 61 ML/MIN/1.73M2
EOSINOPHIL # BLD AUTO: 0.4 10E3/UL (ref 0–0.7)
EOSINOPHIL NFR BLD AUTO: 5 %
ERYTHROCYTE [DISTWIDTH] IN BLOOD BY AUTOMATED COUNT: 14 % (ref 10–15)
FASTING STATUS PATIENT QL REPORTED: YES
FASTING STATUS PATIENT QL REPORTED: YES
GLUCOSE SERPL-MCNC: 92 MG/DL (ref 70–99)
HCO3 SERPL-SCNC: 26 MMOL/L (ref 22–29)
HCT VFR BLD AUTO: 45.2 % (ref 35–47)
HDLC SERPL-MCNC: 61 MG/DL
HGB BLD-MCNC: 14.2 G/DL (ref 11.7–15.7)
IMM GRANULOCYTES # BLD: 0 10E3/UL
IMM GRANULOCYTES NFR BLD: 0 %
LDLC SERPL CALC-MCNC: 150 MG/DL
LYMPHOCYTES # BLD AUTO: 1.9 10E3/UL (ref 0.8–5.3)
LYMPHOCYTES NFR BLD AUTO: 25 %
MCH RBC QN AUTO: 28.9 PG (ref 26.5–33)
MCHC RBC AUTO-ENTMCNC: 31.4 G/DL (ref 31.5–36.5)
MCV RBC AUTO: 92 FL (ref 78–100)
MONOCYTES # BLD AUTO: 0.4 10E3/UL (ref 0–1.3)
MONOCYTES NFR BLD AUTO: 6 %
NEUTROPHILS # BLD AUTO: 4.7 10E3/UL (ref 1.6–8.3)
NEUTROPHILS NFR BLD AUTO: 63 %
NONHDLC SERPL-MCNC: 170 MG/DL
PLATELET # BLD AUTO: 242 10E3/UL (ref 150–450)
POTASSIUM SERPL-SCNC: 4.5 MMOL/L (ref 3.4–5.3)
PROT SERPL-MCNC: 7.4 G/DL (ref 6.4–8.3)
RBC # BLD AUTO: 4.92 10E6/UL (ref 3.8–5.2)
SODIUM SERPL-SCNC: 137 MMOL/L (ref 135–145)
T4 FREE SERPL-MCNC: 0.41 NG/DL (ref 0.9–1.7)
TRIGL SERPL-MCNC: 98 MG/DL
TSH SERPL DL<=0.005 MIU/L-ACNC: 51.2 UIU/ML (ref 0.3–4.2)
VIT D+METAB SERPL-MCNC: 30 NG/ML (ref 20–50)
WBC # BLD AUTO: 7.4 10E3/UL (ref 4–11)

## 2024-10-25 PROCEDURE — 80061 LIPID PANEL: CPT | Performed by: PHYSICIAN ASSISTANT

## 2024-10-25 PROCEDURE — 84443 ASSAY THYROID STIM HORMONE: CPT | Performed by: PHYSICIAN ASSISTANT

## 2024-10-25 PROCEDURE — 84439 ASSAY OF FREE THYROXINE: CPT | Performed by: PHYSICIAN ASSISTANT

## 2024-10-25 PROCEDURE — 85025 COMPLETE CBC W/AUTO DIFF WBC: CPT | Performed by: PHYSICIAN ASSISTANT

## 2024-10-25 PROCEDURE — 99214 OFFICE O/P EST MOD 30 MIN: CPT | Performed by: PHYSICIAN ASSISTANT

## 2024-10-25 PROCEDURE — G2211 COMPLEX E/M VISIT ADD ON: HCPCS | Performed by: PHYSICIAN ASSISTANT

## 2024-10-25 PROCEDURE — 36415 COLL VENOUS BLD VENIPUNCTURE: CPT | Performed by: PHYSICIAN ASSISTANT

## 2024-10-25 PROCEDURE — 80053 COMPREHEN METABOLIC PANEL: CPT | Performed by: PHYSICIAN ASSISTANT

## 2024-10-25 PROCEDURE — 82306 VITAMIN D 25 HYDROXY: CPT | Performed by: PHYSICIAN ASSISTANT

## 2024-10-25 RX ORDER — LEVOTHYROXINE SODIUM 112 UG/1
112 TABLET ORAL DAILY
Qty: 90 TABLET | Refills: 1 | Status: SHIPPED | OUTPATIENT
Start: 2024-10-25

## 2024-10-25 RX ORDER — DONEPEZIL HYDROCHLORIDE 5 MG/1
5 TABLET, FILM COATED ORAL AT BEDTIME
Qty: 30 TABLET | Refills: 1 | Status: SHIPPED | OUTPATIENT
Start: 2024-10-25

## 2024-10-25 ASSESSMENT — ENCOUNTER SYMPTOMS: FATIGUE: 1

## 2024-10-25 ASSESSMENT — PAIN SCALES - GENERAL: PAINLEVEL_OUTOF10: NO PAIN (0)

## 2024-10-25 NOTE — PROGRESS NOTES
Assessment & Plan     (F03.A0) Mild dementia, unspecified dementia type, unspecified whether behavioral, psychotic, or mood disturbance or anxiety (H)  (primary encounter diagnosis)  Comment: Patient with history of dementia has follow-up with cardiology as well as neuropsych testing.  Had been started on donepezil 5 mg.  Had only taken for 1 month.  Started taking before titration to 10 mg.  Discussed with patient and her sister restarting 5 mg as well as titration to 10 mg.  Referral to different neurology team.  They said expressed concerns over following up with [facility], [facility].  Plan: Adult Neurology  Referral, Vitamin D         Deficiency, donepezil (ARICEPT) 5 MG tablet,         Home Care Referral       (E03.9) Hypothyroidism, unspecified type  Comment: Has been having some fatigue as of late discussed recheck of TSH.  Refill of levothyroxine provided.  Plan: levothyroxine (SYNTHROID/LEVOTHROID) 112 MCG         tablet, TSH with free T4 reflex, Comprehensive         metabolic panel (BMP + Alb, Alk Phos, ALT, AST,        Total. Bili, TP), Vitamin D Deficiency          (R53.83) Other fatigue  Comment: Ongoing fatigue cause.  Discussed urinalysis to rule out infection.  Discussed screening labs.  Plan: CBC with platelets and differential, UA         Macroscopic with reflex to Microscopic and         Culture - Lab Collect, Vitamin D Deficiency          (R39.81) Functional incontinence  Comment:   Plan: Home Care Referral          (E66.01) Morbid (severe) obesity due to excess calories (H)  Comment:     (Z13.220) Lipid screening  Comment:   Plan: Lipid panel reflex to direct LDL Fasting            (Z68.32) Body mass index (BMI) 32.0-32.9, adult  Comment:   Plan: Vitamin D Deficiency              Dat Jacinto is a 79 year old, presenting for the following health issues:  Fatigue, Lethargic, and Itching      10/25/2024     8:35 AM   Additional Questions   Roomed by SHERRI SHIELDS  "  Accompanied by SISTER     History of Present Illness       Reason for visit:  Very fatigue Lately. She is missing 2 dose(s) of medications per week.  She is not taking prescribed medications regularly due to remembering to take.     Patient with history of dementia.  Has been more fatigued as of late.  Possibility mismanagement of levothyroxine.  Patient had followed with neurology in January of this year was started on donepezil 5 mg.  However increased to 10 mg and is no longer taking this medication.  Had follow-up with urology for the incontinence.  No medications recommended for functional  Incontinence.  Has been sleeping more.  Has been going through briefs when she sleeps at night.  Sister does not believe she has been getting up at night.  They do have plans to get her into an assisted living facility.  No recent fevers, chills, vomiting, diarrhea.  No infectious symptoms with this.         Review of Systems  Constitutional, HEENT, cardiovascular, pulmonary, gi and gu systems are negative, except as otherwise noted.      Objective    /75   Pulse 67   Temp 97.6  F (36.4  C) (Tympanic)   Resp 16   Ht 1.702 m (5' 7\")   Wt 95.3 kg (210 lb)   SpO2 98%   BMI 32.89 kg/m    Body mass index is 32.89 kg/m .  Physical Exam   GENERAL: alert, no distress, and pleasantly demented  RESP: lungs clear to auscultation - no rales, rhonchi or wheezes  CV: regular rate and rhythm, normal S1 S2, no S3 or S4, no murmur, click or rub, no peripheral edema  ABDOMEN: soft, nontender, no hepatosplenomegaly, no masses and bowel sounds normal  MS: no gross musculoskeletal defects noted, no edema    Results for orders placed or performed in visit on 10/25/24   CBC with platelets and differential     Status: Abnormal   Result Value Ref Range    WBC Count 7.4 4.0 - 11.0 10e3/uL    RBC Count 4.92 3.80 - 5.20 10e6/uL    Hemoglobin 14.2 11.7 - 15.7 g/dL    Hematocrit 45.2 35.0 - 47.0 %    MCV 92 78 - 100 fL    MCH 28.9 26.5 - " 33.0 pg    MCHC 31.4 (L) 31.5 - 36.5 g/dL    RDW 14.0 10.0 - 15.0 %    Platelet Count 242 150 - 450 10e3/uL    % Neutrophils 63 %    % Lymphocytes 25 %    % Monocytes 6 %    % Eosinophils 5 %    % Basophils 0 %    % Immature Granulocytes 0 %    Absolute Neutrophils 4.7 1.6 - 8.3 10e3/uL    Absolute Lymphocytes 1.9 0.8 - 5.3 10e3/uL    Absolute Monocytes 0.4 0.0 - 1.3 10e3/uL    Absolute Eosinophils 0.4 0.0 - 0.7 10e3/uL    Absolute Basophils 0.0 0.0 - 0.2 10e3/uL    Absolute Immature Granulocytes 0.0 <=0.4 10e3/uL   CBC with platelets and differential     Status: Abnormal    Narrative    The following orders were created for panel order CBC with platelets and differential.  Procedure                               Abnormality         Status                     ---------                               -----------         ------                     CBC with platelets and d...[[id number]]  Abnormal            Final result                 Please view results for these tests on the individual orders.           Signed Electronically by: Jeet Mcmahon PA-C     08-Aug-2024 19:16

## 2024-10-25 NOTE — TELEPHONE ENCOUNTER
Home Health Care    Reason for call:  [facility] calling     Orders needed for home bound status from provider     Pt Provider: Maury Mcmahon     Phone Number Homecare Nurse can be reached at:   [phone] - ok      Fax: [phone]     Okay to leave a detailed message?: Yes at Home number on file [phone] (home)     Flora SOLANO,    [facility]

## 2024-10-25 NOTE — PATIENT INSTRUCTIONS
Follow up with neurology     Practice Locations Phone Distance   [facility] [place]  [address] [address] [place] [id number] (Directions) [phone]

## 2024-10-28 ENCOUNTER — TELEPHONE (OUTPATIENT)
Dept: FAMILY MEDICINE | Facility: CLINIC | Age: 79
End: 2024-10-28
Payer: COMMERCIAL

## 2024-10-28 NOTE — TELEPHONE ENCOUNTER
Called and spoke to Adina. She said that the neurologist referral was not accepted and she needs to be referred by another neurologist. Adina states that she is not going back to the other neurologist as she did not care for them. She is also not going to start her on the pills or do anything. She said that the pills cab cause diarrhea, and may only help a little. She said that the medication can cause diarrhea and Adina takes care of her by herself and cannot deal with that.Nelly Sumner [facility]

## 2024-10-28 NOTE — TELEPHONE ENCOUNTER
Patient Returning Call    Reason for call:  SANDI WOULD LIKE TO SPEAK WITH THE PCP REGARDING HER CALLING A NEUR.    Information relayed to patient:  YES     Patient has additional questions:  Yes    What are your questions/concerns:  YES     Okay to leave a detailed message?: Yes at Cell number on file:    [phone]

## 2024-10-28 NOTE — TELEPHONE ENCOUNTER
FYI - Status Update    Who is Calling: nurse, [facility]    Update: They will be doing home care admission tomorrow    Does caller want a call/response back: No    Nelly VELASCO [facility]

## 2024-10-29 ENCOUNTER — TELEPHONE (OUTPATIENT)
Dept: FAMILY MEDICINE | Facility: CLINIC | Age: 79
End: 2024-10-29
Payer: COMMERCIAL

## 2024-10-29 ENCOUNTER — MEDICAL CORRESPONDENCE (OUTPATIENT)
Dept: HEALTH INFORMATION MANAGEMENT | Facility: CLINIC | Age: 79
End: 2024-10-29

## 2024-10-29 NOTE — TELEPHONE ENCOUNTER
Home Care is calling regarding an established patient with [facility].       Requesting orders from: Jeet Mcmahon  Provider is following patient: Yes  Is this a 60-day recertification request?  No    Orders Requested    Skilled Nursing  Request for initial certification (first set of orders)   Frequency: once weekly x 3 weeks, then every other week x 5 weeks    Occupational Therapy  Request for initial evaluation and treatment (one time)     Speech Therapy  Request for initial evaluation and treatment (one time)     Social Work  Request for initial evaluation and treatment (one time)       Information was gathered and will be sent to provider for review.  RN will contact Home Care with information after provider review.  Contact information confirmed and updated as needed.    Jania Rhodes RN

## 2024-10-30 NOTE — TELEPHONE ENCOUNTER
Called and left a generic message to call clinic back to give verbal orders and to verify received correct referral changes that were needed. See provider message.    Caitlyn Lovelace RN

## 2024-10-30 NOTE — TELEPHONE ENCOUNTER
Please contact    Unfortunately, urology is unable to take a new patient through the [facility] system.  If there are different neurology group you would like to follow-up with?  Otherwise recommendations were to find a different provider with in the [facility], [facility] network.    Jeet Mcmahon PA-C

## 2024-10-30 NOTE — TELEPHONE ENCOUNTER
[facility] calling back and  does not have referral. Will call back if changes are needed to home care referral.     Caitlyn Lovelace RN

## 2024-10-31 NOTE — TELEPHONE ENCOUNTER
Left message on patient's voicemail with Maury's message.Nelly Sumner [facility]

## 2024-10-31 NOTE — TELEPHONE ENCOUNTER
Francisca calling to confirm that new order was sent stating pt's home bound status. Verified it was signed on 10/25. No additional questions at this time.     Thank you - Mary Fernando, JAREKN, RN

## 2024-11-05 ENCOUNTER — TELEPHONE (OUTPATIENT)
Dept: FAMILY MEDICINE | Facility: CLINIC | Age: 79
End: 2024-11-05
Payer: COMMERCIAL

## 2024-11-05 DIAGNOSIS — F03.A0 MILD DEMENTIA, UNSPECIFIED DEMENTIA TYPE, UNSPECIFIED WHETHER BEHAVIORAL, PSYCHOTIC, OR MOOD DISTURBANCE OR ANXIETY (H): Primary | ICD-10-CM

## 2024-11-05 NOTE — TELEPHONE ENCOUNTER
FYI - Status Update    Who is Calling: nurse, [facility]    Update: Patient is struggling to take levothyroxine.  Forgetting to take medication (dementia) has not taken for 4-5 days.  Please advise on how to proceed.    Does caller want a call/response back: Yes

## 2024-11-05 NOTE — TELEPHONE ENCOUNTER
Please contact.     Can restart levothyroxine at any time. Can someone their provide the medications for the patient daily?     If the nurse is coming daily can they assist with medications?     Is the sister able to help with this or does she need to be moved from this residence?     Jeet Mcmahon PA-C

## 2024-11-05 NOTE — TELEPHONE ENCOUNTER
Alicja was contacted.  She says nursing does not come everyday and they are a temporary service.  Due to the patient's dementia she is not taking the pill from her sister nor from her pill box.      Nurse states sister is looking into Assisted Living but not having much luck.  Nurse is having their  look into helping the patient as well.    Provider: Care Coordination referral made.  Any other referrals needed?      Kristina Kjellberg, MSN, RN

## 2024-11-05 NOTE — TELEPHONE ENCOUNTER
At this time patient appears to need higher level of assistance if she is unable to manage medications on her own and unwilling to take the support of those around her.     Consider possibility of receiving  daily care?    Jeet Mcmahon PA-C

## 2024-11-05 NOTE — TELEPHONE ENCOUNTER
Per Alicja, their agency cannot do Daily visits for the patient.      Routing to provider for update.    Kristina Kjellberg, MSN, RN  [facility] Triage

## 2024-11-06 ENCOUNTER — PATIENT OUTREACH (OUTPATIENT)
Dept: CARE COORDINATION | Facility: CLINIC | Age: 79
End: 2024-11-06
Payer: COMMERCIAL

## 2024-11-06 ENCOUNTER — TELEPHONE (OUTPATIENT)
Dept: FAMILY MEDICINE | Facility: CLINIC | Age: 79
End: 2024-11-06
Payer: COMMERCIAL

## 2024-11-06 NOTE — PROGRESS NOTES
Clinic Care Coordination Contact  Community Health Worker Initial Outreach    CHW Initial Information Gathering:  Referral Source: PCP  Preferred Hospital: [place], [place]  [phone]  Preferred Urgent Care: [facility] [phone]  Current living arrangement:: I live in a private home with family  Community Resources: Financial/Insurance  Informal Support system:: Family  No PCP office visit in Past Year: No  Transportation means:: Family       Patient accepts CC: Yes. Patient scheduled for assessment with the SW on 11/7/24 at 1:00 pm. Patient noted desire to discuss supports for respite care for the patient's sister who is the main care giver. The patient is on a waiting list for a living facility.     KORIN Londono  [phone]  Connected Care Resource [facility]

## 2024-11-06 NOTE — TELEPHONE ENCOUNTER
Reason for Call:  Form, our goal is to have forms completed with 72 hours, however, some forms may require a visit or additional information.    Type of letter, form or note:  Home Health Certification    Who is the form from?: Home care    Where did the form come from: form was faxed in    What clinic location was the form placed at?: [place]    Where the form was placed: Given to physician    What number is listed as a contact on the form?: [phone]       Additional comments: placed in your basket    Call taken on 11/6/2024 at 12:34 PM by Nelly Sumner MA

## 2024-11-07 ENCOUNTER — PATIENT OUTREACH (OUTPATIENT)
Dept: NURSING | Facility: CLINIC | Age: 79
End: 2024-11-07
Payer: COMMERCIAL

## 2024-11-07 NOTE — LETTER
[facility] C[facility] COORDINATION  [address] [facility] [address]    November 7, 2024    Ophelia Spangler  [address] [address] [facility] [address]      Dear Ophelia,    I am a clinic care coordinator who works with Jeet Mcmahon PA-C with the [facility]. I wanted to thank you for spending the time to talk with me.  Below is a description of clinic care coordination and how I can further assist you.       The clinic care coordination team is made up of a registered nurse, , financial resource worker and community health worker who understand the health care system. The goal of clinic care coordination is to help you manage your health and improve access to the health care system. Our team works alongside your provider to assist you in determining your health and social needs. We can help you obtain health care and community resources, providing you with necessary information and education. We can work with you through any barriers and develop a care plan that helps coordinate and strengthen the communication between you and your care team.  Our services are voluntary and are offered without charge to you personally.    Please feel free to contact me with any questions or concerns regarding care coordination and what we can offer.      We are focused on providing you with the highest-quality healthcare experience possible.    Sincerely,     SHELIA Dominguez  Social Work Primary Care Clinic Care Coordinator  [facility]  [phone]  [email or web address]      Enclosed: Consent to communicate form. Please complete bring to clinic. Thank you.      *Print CTC form.

## 2024-11-07 NOTE — PROGRESS NOTES
Clinic Care Coordination Contact  Follow Up Progress Note      Assessment: ISABEL CC outreached to pt for initial assessment per CHW scheduling. SW introduce self and role of clinic care coordination. ISABEL spoke with pt and her sister Adina. Pt provide verbal consent for SW to talk with her sister. ISABEL explained a CTC form will need to be complete. SW will place one in the mail for pt.     Sister noted her concern is pt is refusing medications. Sister has started to help manage pt's medication lately due to confusion and forgetfulness. Pt had an episode recently where she walked away from home, thankfully they were able to find pt. Sister is concern eventually pt will need 24/7 supervision and sister and family are not able to provide that. Sister is doing all she can at the moment. Pt is on MA and is on a EW waiver and  has found a senior center for pt in [place], but she is next in line to move into facility. There is no move in date because she is on the wait list with no available room yet. ISABEL CC discussed putting in place PCA services to help with medication reminders and supervision for pt. Sister declined PCA services because she is home with pt most of the day. Sister remind pt to take her medications, but pt still refuses. Pt is still independent with most ADLs. Sister did find a person who is willing to do respite services for pt, which provides a break for the sister. Pt is private paying for this at the moment. ISABEL CC suggest for sister to speak with EW waiver  and see if EW would cover any respite  services.     Sister main concern is not knowing when the Assisted Living facility will have an open bed for pt to move in. ISABEL suggest to give the facility admission office a call and check on a move date periodically. Sister agree to call.     Pt has EW waiver  name Celi from [facility]. Pt's last assessment was complete in July 2024.       Care Gaps:    Health  Maintenance Due   Topic Date Due    DEXA  Never done    HEPATITIS C SCREENING  Never done    RSV VACCINE (1 - 1-dose 75+ series) Never done    INFLUENZA VACCINE (1) 09/01/2024    COVID-19 Vaccine (4 - 2024-25 season) 09/01/2024         Intervention/Education provided during outreach: ISABEL CC recommend pt and family continue to connect with EW waiver  for on going needs for pt. Pt and family reports understanding and denies any additional questions or concerns at this time.     Plan: Pt is working with EW  with assisted living facility placement for additional on going supports. No further outreaches will be made at this time unless a new referral is made or a change in the pt's status occurs.     ISABEL CC mail consent to communicate form to patient.     SHELIA Dominguez  Social Work Primary Care Clinic Care Coordinator  [facility]  [phone]  [email or web address]

## 2024-11-08 ENCOUNTER — TELEPHONE (OUTPATIENT)
Dept: FAMILY MEDICINE | Facility: CLINIC | Age: 79
End: 2024-11-08
Payer: COMMERCIAL

## 2024-11-08 NOTE — TELEPHONE ENCOUNTER
Home Care is calling regarding an established patient with [facility].        10/29/2024     2:30 PM   Home Care Information   Date of Home Care episode start 10/29/2024   Current following provider Maury Mcmahon PA-C   Date provider agreed to follow 10/25/2024    Name/Phone Number DAWOOD Auguste  [phone]   Home Care agency [facility]     Requesting orders from: Jeet Mcmahon  Provider is following patient: Yes  Is this a 60-day recertification request?  No    Orders Requested    Speech Therapy  Request for delay in care, service is not able to be provided within same scheduled day.   Services rescheduled to Monday 11/11.    Information was gathered and will be sent to provider for review.  RN will contact Home Care with information after provider review.  Confirmed ok to leave a detailed message with call back.  Contact information confirmed and updated as needed.    Mary Fernando RN

## 2024-11-11 ENCOUNTER — TELEPHONE (OUTPATIENT)
Dept: FAMILY MEDICINE | Facility: CLINIC | Age: 79
End: 2024-11-11
Payer: COMMERCIAL

## 2024-11-11 NOTE — TELEPHONE ENCOUNTER
"SHAYY Adam is calling to report patient won't take her Levothyroxine 112 mcg. States patient has dementia and sister baldemar takes care of her. Kia suggested to patient to take with food such as pudding but patient refused. Patient states, \"I'm not suppose to take with food.\" Kia states patient has not taken this medication in a long time and is requesting to discontinue her Levothyroxine.      Routing to provider to review and further advise.     DAWOOD King  [facility]      "

## 2024-11-11 NOTE — TELEPHONE ENCOUNTER
Called [facility] and informed Yudith of approval of verbal orders. No additional questions at this time.     Thank you - Mary Fernando, JAREKN, RN

## 2024-11-14 NOTE — TELEPHONE ENCOUNTER
Kia called back and writer relayed provider message below. She verbalized understanding and stated no further action was needed from the clinic.  Caitlyn Nation RN    [facility]- Primary Care

## 2024-11-14 NOTE — TELEPHONE ENCOUNTER
Can levothyroxine be discontinued on medication list?  If so, please discontinue.    Camila Rhodes BSN, RN

## 2024-11-14 NOTE — TELEPHONE ENCOUNTER
I have completed vulnerable adult paperwork as the patient is not able to care for herself, not taking medications appropriately and is refusing medications.     Jeet Mcmahon PA-C

## 2024-11-14 NOTE — TELEPHONE ENCOUNTER
I left a message for Kia, on a confidential VM that identified her, with the provider recommendations and actions below.  [phone] if she has questions. Thank you. Janna Robertson R.N.

## 2024-11-15 ENCOUNTER — TELEPHONE (OUTPATIENT)
Dept: FAMILY MEDICINE | Facility: CLINIC | Age: 79
End: 2024-11-15
Payer: COMMERCIAL

## 2024-11-15 NOTE — TELEPHONE ENCOUNTER
Lona from [facility] Adult Protection dept calling because they received a vulnerable adult report from Jeet Mcmahon PA-C.    Lona asked why it was filed and this RN informed her that home care said she was refusing to take her medication, levothyroxine. She asked where it was mentioned about her cognitive status.     This RN referred to office visit with PCP from 1/10/24 and 10/25/24 for mild dementia. Her sister is caring for patient.    No further questions.  Camila TILLEYN, RN

## 2024-11-19 ENCOUNTER — TELEPHONE (OUTPATIENT)
Dept: FAMILY MEDICINE | Facility: CLINIC | Age: 79
End: 2024-11-19
Payer: COMMERCIAL

## 2024-11-19 NOTE — TELEPHONE ENCOUNTER
FYI - Status Update    Who is Calling: nurseCarolynn, RN with [facility]    Update: They are moving her OT eval from last week, to this week.  She will be seen on Thursday 11/21/24.    Does caller want a call/response back: No    Okay to leave a detailed message?:     Thank you,  Tayler PERALTA    [phone]

## 2024-11-21 DIAGNOSIS — Z53.9 DIAGNOSIS NOT YET DEFINED: Primary | ICD-10-CM

## 2024-11-27 ENCOUNTER — MEDICAL CORRESPONDENCE (OUTPATIENT)
Dept: HEALTH INFORMATION MANAGEMENT | Facility: CLINIC | Age: 79
End: 2024-11-27

## 2024-11-27 ENCOUNTER — LAB (OUTPATIENT)
Dept: LAB | Facility: CLINIC | Age: 79
End: 2024-11-27
Payer: COMMERCIAL

## 2024-11-27 ENCOUNTER — TELEPHONE (OUTPATIENT)
Dept: FAMILY MEDICINE | Facility: CLINIC | Age: 79
End: 2024-11-27

## 2024-11-27 DIAGNOSIS — E03.9 HYPOTHYROIDISM, UNSPECIFIED TYPE: ICD-10-CM

## 2024-11-27 LAB
T4 FREE SERPL-MCNC: 0.13 NG/DL (ref 0.9–1.7)
TSH SERPL DL<=0.005 MIU/L-ACNC: 57.2 UIU/ML (ref 0.3–4.2)

## 2024-11-27 PROCEDURE — 84443 ASSAY THYROID STIM HORMONE: CPT

## 2024-11-27 PROCEDURE — 84439 ASSAY OF FREE THYROXINE: CPT

## 2024-11-27 PROCEDURE — 36415 COLL VENOUS BLD VENIPUNCTURE: CPT

## 2024-11-27 NOTE — TELEPHONE ENCOUNTER
Forms/Letter Request    Type of form/letter: POLST      Do we have the form/letter: Yes:     Who is the form from? Patient    Where did/will the form come from? Patient or family brought in       When is form/letter needed by: ASAP    How would you like the form/letter returned:     Patient Notified form requests are processed in 5-7 business days:Yes    Okay to leave a detailed message?: Yes at Cell number on file:   [phone]  No relevant phone numbers on file.

## 2024-12-03 NOTE — TELEPHONE ENCOUNTER
Called and spoke to Adina, she would like the form mailed to her, done. Stat scanned .Nelly Sumner [facility]

## 2024-12-04 ENCOUNTER — DOCUMENTATION ONLY (OUTPATIENT)
Dept: OTHER | Facility: CLINIC | Age: 79
End: 2024-12-04
Payer: COMMERCIAL

## 2025-01-08 ENCOUNTER — TELEPHONE (OUTPATIENT)
Dept: FAMILY MEDICINE | Facility: CLINIC | Age: 80
End: 2025-01-08
Payer: COMMERCIAL

## 2025-01-08 DIAGNOSIS — E03.9 HYPOTHYROIDISM, UNSPECIFIED TYPE: Primary | ICD-10-CM

## 2025-01-08 NOTE — TELEPHONE ENCOUNTER
Please contact.  Need to have consistent medication use for 4 to 6 weeks.  I have placed a future order for TSH level.  Can make a lab only appointment to have completed..       Jeet Mcmahon PA-C

## 2025-01-08 NOTE — TELEPHONE ENCOUNTER
Returned call to Adina, patient's sister. Discussed provider's response, as noted below.  Adina voiced clear understanding and agreed with this plan. Will continue making sure patient is taking her medication and will set up a lab-only apt in another couple weeks.  No additional questions at this time.      Kalyani Sarmiento RN  Clinical Triage/Primary Care  [facility]

## 2025-01-08 NOTE — TELEPHONE ENCOUNTER
Patient's sister, Adina, called to clinic to inquire about having patient recheck her thyroid blood levels.  Adina is patient's caretaker and notes she is the POA.    Patient had previously been non-compliant with taking her Levothyroxine consistently and had a blood test done back in November (11/27/24), value was elevated at 57.20.  Adina notes that she has been able to get patient to consistently take the Levothyroxine now for the past 3 weeks, every day, and is wondering if patient should recheck her TSH level again? If so, when should she do this? Can you place orders if agreeable.      Routing to provider to review and advise.      Kalyani Sarmiento RN  Clinical Triage/Primary Care  [facility]

## 2025-01-21 ENCOUNTER — MEDICAL CORRESPONDENCE (OUTPATIENT)
Dept: HEALTH INFORMATION MANAGEMENT | Facility: CLINIC | Age: 80
End: 2025-01-21

## 2025-01-24 ENCOUNTER — LAB REQUISITION (OUTPATIENT)
Dept: LAB | Facility: CLINIC | Age: 80
End: 2025-01-24
Payer: COMMERCIAL

## 2025-01-24 DIAGNOSIS — G30.1 ALZHEIMER'S DISEASE WITH LATE ONSET (CODE) (H): ICD-10-CM

## 2025-01-28 LAB
TSH SERPL DL<=0.005 MIU/L-ACNC: 9.64 UIU/ML (ref 0.3–4.2)
VIT B12 SERPL-MCNC: 433 PG/ML (ref 232–1245)

## 2025-01-28 PROCEDURE — 82607 VITAMIN B-12: CPT | Mod: ORL | Performed by: PHYSICIAN ASSISTANT

## 2025-01-28 PROCEDURE — P9604 ONE-WAY ALLOW PRORATED TRIP: HCPCS | Mod: ORL | Performed by: PHYSICIAN ASSISTANT

## 2025-01-28 PROCEDURE — 36415 COLL VENOUS BLD VENIPUNCTURE: CPT | Mod: ORL | Performed by: PHYSICIAN ASSISTANT

## 2025-01-28 PROCEDURE — 84443 ASSAY THYROID STIM HORMONE: CPT | Mod: ORL | Performed by: PHYSICIAN ASSISTANT

## 2025-02-04 LAB — HOLD SPECIMEN: NORMAL

## 2025-02-04 PROCEDURE — 36415 COLL VENOUS BLD VENIPUNCTURE: CPT | Mod: ORL | Performed by: PHYSICIAN ASSISTANT

## 2025-03-09 ENCOUNTER — LAB REQUISITION (OUTPATIENT)
Dept: LAB | Facility: CLINIC | Age: 80
End: 2025-03-09
Payer: COMMERCIAL

## 2025-03-09 DIAGNOSIS — E03.8 OTHER SPECIFIED HYPOTHYROIDISM: ICD-10-CM

## 2025-03-25 LAB — TSH SERPL DL<=0.005 MIU/L-ACNC: 4.59 UIU/ML (ref 0.3–4.2)

## 2025-03-25 PROCEDURE — 36415 COLL VENOUS BLD VENIPUNCTURE: CPT | Mod: ORL | Performed by: PHYSICIAN ASSISTANT

## 2025-03-25 PROCEDURE — P9604 ONE-WAY ALLOW PRORATED TRIP: HCPCS | Mod: ORL | Performed by: PHYSICIAN ASSISTANT

## 2025-03-25 PROCEDURE — 84443 ASSAY THYROID STIM HORMONE: CPT | Mod: ORL | Performed by: PHYSICIAN ASSISTANT

## 2025-05-02 ENCOUNTER — LAB REQUISITION (OUTPATIENT)
Dept: LAB | Facility: CLINIC | Age: 80
End: 2025-05-02
Payer: COMMERCIAL

## 2025-05-02 DIAGNOSIS — E03.8 OTHER SPECIFIED HYPOTHYROIDISM: ICD-10-CM

## 2025-05-06 LAB — TSH SERPL DL<=0.005 MIU/L-ACNC: 0.42 UIU/ML (ref 0.3–4.2)

## 2025-05-09 ENCOUNTER — HOSPITAL ENCOUNTER (INPATIENT)
Facility: HOSPITAL | Age: 80
LOS: 4 days | Discharge: NURSING, REHAB OR LONG-TERM CARE | End: 2025-05-13
Attending: EMERGENCY MEDICINE | Admitting: INTERNAL MEDICINE
Payer: COMMERCIAL

## 2025-05-09 ENCOUNTER — APPOINTMENT (OUTPATIENT)
Dept: CT IMAGING | Facility: HOSPITAL | Age: 80
End: 2025-05-09
Attending: EMERGENCY MEDICINE
Payer: COMMERCIAL

## 2025-05-09 ENCOUNTER — APPOINTMENT (OUTPATIENT)
Dept: RADIOLOGY | Facility: HOSPITAL | Age: 80
End: 2025-05-09
Attending: EMERGENCY MEDICINE
Payer: COMMERCIAL

## 2025-05-09 DIAGNOSIS — E66.01 MORBID (SEVERE) OBESITY DUE TO EXCESS CALORIES (H): ICD-10-CM

## 2025-05-09 DIAGNOSIS — S72.001A HIP FRACTURE, RIGHT, CLOSED, INITIAL ENCOUNTER (H): Primary | ICD-10-CM

## 2025-05-09 LAB
ALBUMIN SERPL BCG-MCNC: 3.9 G/DL (ref 3.5–5.2)
ALP SERPL-CCNC: 104 U/L (ref 40–150)
ALT SERPL W P-5'-P-CCNC: 18 U/L (ref 0–50)
ANION GAP SERPL CALCULATED.3IONS-SCNC: 6 MMOL/L (ref 7–15)
AST SERPL W P-5'-P-CCNC: 21 U/L (ref 0–45)
BASOPHILS # BLD AUTO: 0.1 10E3/UL (ref 0–0.2)
BASOPHILS NFR BLD AUTO: 0 %
BILIRUB DIRECT SERPL-MCNC: 0.17 MG/DL (ref 0–0.3)
BILIRUB SERPL-MCNC: 0.5 MG/DL
BUN SERPL-MCNC: 19.9 MG/DL (ref 8–23)
CALCIUM SERPL-MCNC: 9.5 MG/DL (ref 8.8–10.4)
CHLORIDE SERPL-SCNC: 106 MMOL/L (ref 98–107)
CK SERPL-CCNC: 87 U/L (ref 26–192)
CREAT SERPL-MCNC: 0.62 MG/DL (ref 0.51–0.95)
EGFRCR SERPLBLD CKD-EPI 2021: 90 ML/MIN/1.73M2
EOSINOPHIL # BLD AUTO: 0 10E3/UL (ref 0–0.7)
EOSINOPHIL NFR BLD AUTO: 0 %
ERYTHROCYTE [DISTWIDTH] IN BLOOD BY AUTOMATED COUNT: 13.2 % (ref 10–15)
GLUCOSE SERPL-MCNC: 117 MG/DL (ref 70–99)
HCO3 SERPL-SCNC: 28 MMOL/L (ref 22–29)
HCT VFR BLD AUTO: 39 % (ref 35–47)
HGB BLD-MCNC: 12.7 G/DL (ref 11.7–15.7)
HOLD SPECIMEN: NORMAL
HOLD SPECIMEN: NORMAL
IMM GRANULOCYTES # BLD: 0.1 10E3/UL
IMM GRANULOCYTES NFR BLD: 1 %
LYMPHOCYTES # BLD AUTO: 0.9 10E3/UL (ref 0.8–5.3)
LYMPHOCYTES NFR BLD AUTO: 6 %
MCH RBC QN AUTO: 28.6 PG (ref 26.5–33)
MCHC RBC AUTO-ENTMCNC: 32.6 G/DL (ref 31.5–36.5)
MCV RBC AUTO: 88 FL (ref 78–100)
MONOCYTES # BLD AUTO: 0.8 10E3/UL (ref 0–1.3)
MONOCYTES NFR BLD AUTO: 5 %
NEUTROPHILS # BLD AUTO: 13.3 10E3/UL (ref 1.6–8.3)
NEUTROPHILS NFR BLD AUTO: 88 %
NRBC # BLD AUTO: 0 10E3/UL
NRBC BLD AUTO-RTO: 0 /100
PLATELET # BLD AUTO: 209 10E3/UL (ref 150–450)
POTASSIUM SERPL-SCNC: 3.7 MMOL/L (ref 3.4–5.3)
PROT SERPL-MCNC: 7.1 G/DL (ref 6.4–8.3)
RBC # BLD AUTO: 4.44 10E6/UL (ref 3.8–5.2)
SODIUM SERPL-SCNC: 140 MMOL/L (ref 135–145)
WBC # BLD AUTO: 15 10E3/UL (ref 4–11)

## 2025-05-09 PROCEDURE — 93005 ELECTROCARDIOGRAM TRACING: CPT | Performed by: EMERGENCY MEDICINE

## 2025-05-09 PROCEDURE — 99285 EMERGENCY DEPT VISIT HI MDM: CPT | Mod: 25

## 2025-05-09 PROCEDURE — 85025 COMPLETE CBC W/AUTO DIFF WBC: CPT | Performed by: EMERGENCY MEDICINE

## 2025-05-09 PROCEDURE — 36415 COLL VENOUS BLD VENIPUNCTURE: CPT | Performed by: EMERGENCY MEDICINE

## 2025-05-09 PROCEDURE — 96375 TX/PRO/DX INJ NEW DRUG ADDON: CPT

## 2025-05-09 PROCEDURE — 250N000011 HC RX IP 250 OP 636: Mod: JZ | Performed by: EMERGENCY MEDICINE

## 2025-05-09 PROCEDURE — 73502 X-RAY EXAM HIP UNI 2-3 VIEWS: CPT

## 2025-05-09 PROCEDURE — 82248 BILIRUBIN DIRECT: CPT | Performed by: EMERGENCY MEDICINE

## 2025-05-09 PROCEDURE — 73560 X-RAY EXAM OF KNEE 1 OR 2: CPT | Mod: RT

## 2025-05-09 PROCEDURE — 72125 CT NECK SPINE W/O DYE: CPT

## 2025-05-09 PROCEDURE — 96376 TX/PRO/DX INJ SAME DRUG ADON: CPT

## 2025-05-09 PROCEDURE — 120N000001 HC R&B MED SURG/OB

## 2025-05-09 PROCEDURE — 96374 THER/PROPH/DIAG INJ IV PUSH: CPT

## 2025-05-09 PROCEDURE — 73700 CT LOWER EXTREMITY W/O DYE: CPT | Mod: RT

## 2025-05-09 PROCEDURE — 99222 1ST HOSP IP/OBS MODERATE 55: CPT | Performed by: INTERNAL MEDICINE

## 2025-05-09 PROCEDURE — 70450 CT HEAD/BRAIN W/O DYE: CPT

## 2025-05-09 PROCEDURE — 82550 ASSAY OF CK (CPK): CPT | Performed by: EMERGENCY MEDICINE

## 2025-05-09 RX ORDER — MORPHINE SULFATE 4 MG/ML
4 INJECTION, SOLUTION INTRAMUSCULAR; INTRAVENOUS ONCE
Refills: 0 | Status: COMPLETED | OUTPATIENT
Start: 2025-05-09 | End: 2025-05-09

## 2025-05-09 RX ORDER — KETOROLAC TROMETHAMINE 15 MG/ML
15 INJECTION, SOLUTION INTRAMUSCULAR; INTRAVENOUS ONCE
Status: COMPLETED | OUTPATIENT
Start: 2025-05-09 | End: 2025-05-09

## 2025-05-09 RX ADMIN — MORPHINE SULFATE 4 MG: 4 INJECTION, SOLUTION INTRAMUSCULAR; INTRAVENOUS at 21:42

## 2025-05-09 RX ADMIN — KETOROLAC TROMETHAMINE 15 MG: 15 INJECTION, SOLUTION INTRAMUSCULAR; INTRAVENOUS at 21:44

## 2025-05-09 RX ADMIN — MORPHINE SULFATE 4 MG: 4 INJECTION, SOLUTION INTRAMUSCULAR; INTRAVENOUS at 20:09

## 2025-05-09 ASSESSMENT — ACTIVITIES OF DAILY LIVING (ADL)
ADLS_ACUITY_SCORE: 41

## 2025-05-09 ASSESSMENT — COLUMBIA-SUICIDE SEVERITY RATING SCALE - C-SSRS
6. HAVE YOU EVER DONE ANYTHING, STARTED TO DO ANYTHING, OR PREPARED TO DO ANYTHING TO END YOUR LIFE?: NO
2. HAVE YOU ACTUALLY HAD ANY THOUGHTS OF KILLING YOURSELF IN THE PAST MONTH?: NO
1. IN THE PAST MONTH, HAVE YOU WISHED YOU WERE DEAD OR WISHED YOU COULD GO TO SLEEP AND NOT WAKE UP?: NO

## 2025-05-09 NOTE — ED TRIAGE NOTES
Patient presents to ED following a fall at a memory care facility.  Medics stated it was unknown if fall was witnessed.  Complaining of right hip pain.  Patient not able to answer questions very well.  No change in mentation according to medics.  No blood thinners.  Daughter Adina ([phone]) wanted patient checked out.    Triage Assessment (Adult)       Row Name 05/09/25 9229          Triage Assessment    Airway WDL WDL        Respiratory WDL    Respiratory WDL WDL        Skin Circulation/Temperature WDL    Skin Circulation/Temperature WDL WDL        Cardiac WDL    Cardiac WDL WDL        Peripheral/Neurovascular WDL    Peripheral Neurovascular WDL WDL        Cognitive/Neuro/Behavioral WDL    Cognitive/Neuro/Behavioral WDL WDL

## 2025-05-10 ENCOUNTER — ANESTHESIA EVENT (OUTPATIENT)
Dept: SURGERY | Facility: HOSPITAL | Age: 80
End: 2025-05-10
Payer: COMMERCIAL

## 2025-05-10 ENCOUNTER — ANESTHESIA (OUTPATIENT)
Dept: SURGERY | Facility: HOSPITAL | Age: 80
End: 2025-05-10
Payer: COMMERCIAL

## 2025-05-10 ENCOUNTER — APPOINTMENT (OUTPATIENT)
Dept: RADIOLOGY | Facility: HOSPITAL | Age: 80
End: 2025-05-10
Attending: STUDENT IN AN ORGANIZED HEALTH CARE EDUCATION/TRAINING PROGRAM
Payer: COMMERCIAL

## 2025-05-10 LAB
ABO + RH BLD: NORMAL
ANION GAP SERPL CALCULATED.3IONS-SCNC: 8 MMOL/L (ref 7–15)
ATRIAL RATE - MUSE: 74 BPM
BLD GP AB SCN SERPL QL: NEGATIVE
BUN SERPL-MCNC: 21.5 MG/DL (ref 8–23)
CALCIUM SERPL-MCNC: 9.3 MG/DL (ref 8.8–10.4)
CHLORIDE SERPL-SCNC: 109 MMOL/L (ref 98–107)
CREAT SERPL-MCNC: 0.62 MG/DL (ref 0.51–0.95)
DIASTOLIC BLOOD PRESSURE - MUSE: 64 MMHG
EGFRCR SERPLBLD CKD-EPI 2021: 90 ML/MIN/1.73M2
ERYTHROCYTE [DISTWIDTH] IN BLOOD BY AUTOMATED COUNT: 13.2 % (ref 10–15)
ERYTHROCYTE [DISTWIDTH] IN BLOOD BY AUTOMATED COUNT: 13.4 % (ref 10–15)
FLUAV RNA SPEC QL NAA+PROBE: NEGATIVE
FLUBV RNA RESP QL NAA+PROBE: NEGATIVE
GLUCOSE SERPL-MCNC: 100 MG/DL (ref 70–99)
HCO3 SERPL-SCNC: 24 MMOL/L (ref 22–29)
HCT VFR BLD AUTO: 35.1 % (ref 35–47)
HCT VFR BLD AUTO: 37.5 % (ref 35–47)
HGB BLD-MCNC: 11.2 G/DL (ref 11.7–15.7)
HGB BLD-MCNC: 12.1 G/DL (ref 11.7–15.7)
HOLD SPECIMEN: NORMAL
HOLD SPECIMEN: NORMAL
INR PPP: 1.12 (ref 0.85–1.15)
INR PPP: 1.16 (ref 0.85–1.15)
INTERPRETATION ECG - MUSE: NORMAL
MAGNESIUM SERPL-MCNC: 1.9 MG/DL (ref 1.7–2.3)
MCH RBC QN AUTO: 28.1 PG (ref 26.5–33)
MCH RBC QN AUTO: 28.2 PG (ref 26.5–33)
MCHC RBC AUTO-ENTMCNC: 31.9 G/DL (ref 31.5–36.5)
MCHC RBC AUTO-ENTMCNC: 32.3 G/DL (ref 31.5–36.5)
MCV RBC AUTO: 87 FL (ref 78–100)
MCV RBC AUTO: 88 FL (ref 78–100)
P AXIS - MUSE: 61 DEGREES
PHOSPHATE SERPL-MCNC: 3.6 MG/DL (ref 2.5–4.5)
PLATELET # BLD AUTO: 173 10E3/UL (ref 150–450)
PLATELET # BLD AUTO: 186 10E3/UL (ref 150–450)
POTASSIUM SERPL-SCNC: 3.9 MMOL/L (ref 3.4–5.3)
PR INTERVAL - MUSE: 170 MS
PROTHROMBIN TIME: 14.7 SECONDS (ref 11.8–14.8)
PROTHROMBIN TIME: 15.1 SECONDS (ref 11.8–14.8)
QRS DURATION - MUSE: 86 MS
QT - MUSE: 388 MS
QTC - MUSE: 430 MS
R AXIS - MUSE: 2 DEGREES
RBC # BLD AUTO: 3.99 10E6/UL (ref 3.8–5.2)
RBC # BLD AUTO: 4.29 10E6/UL (ref 3.8–5.2)
RSV RNA SPEC NAA+PROBE: NEGATIVE
SARS-COV-2 RNA RESP QL NAA+PROBE: NEGATIVE
SODIUM SERPL-SCNC: 141 MMOL/L (ref 135–145)
SPECIMEN EXP DATE BLD: NORMAL
SYSTOLIC BLOOD PRESSURE - MUSE: 140 MMHG
T AXIS - MUSE: 21 DEGREES
VENTRICULAR RATE- MUSE: 74 BPM
WBC # BLD AUTO: 6.8 10E3/UL (ref 4–11)
WBC # BLD AUTO: 8.2 10E3/UL (ref 4–11)

## 2025-05-10 PROCEDURE — 87637 SARSCOV2&INF A&B&RSV AMP PRB: CPT | Performed by: INTERNAL MEDICINE

## 2025-05-10 PROCEDURE — 36415 COLL VENOUS BLD VENIPUNCTURE: CPT | Performed by: STUDENT IN AN ORGANIZED HEALTH CARE EDUCATION/TRAINING PROGRAM

## 2025-05-10 PROCEDURE — 250N000011 HC RX IP 250 OP 636: Mod: JZ | Performed by: INTERNAL MEDICINE

## 2025-05-10 PROCEDURE — 258N000003 HC RX IP 258 OP 636: Performed by: INTERNAL MEDICINE

## 2025-05-10 PROCEDURE — 85018 HEMOGLOBIN: CPT | Performed by: INTERNAL MEDICINE

## 2025-05-10 PROCEDURE — 36415 COLL VENOUS BLD VENIPUNCTURE: CPT | Performed by: INTERNAL MEDICINE

## 2025-05-10 PROCEDURE — 999N000065 XR PELVIS AND HIP PORTABLE RIGHT 1 VIEW

## 2025-05-10 PROCEDURE — 370N000017 HC ANESTHESIA TECHNICAL FEE, PER MIN: Performed by: STUDENT IN AN ORGANIZED HEALTH CARE EDUCATION/TRAINING PROGRAM

## 2025-05-10 PROCEDURE — 250N000009 HC RX 250: Performed by: STUDENT IN AN ORGANIZED HEALTH CARE EDUCATION/TRAINING PROGRAM

## 2025-05-10 PROCEDURE — 0SRR019 REPLACEMENT OF RIGHT HIP JOINT, FEMORAL SURFACE WITH METAL SYNTHETIC SUBSTITUTE, CEMENTED, OPEN APPROACH: ICD-10-PCS | Performed by: STUDENT IN AN ORGANIZED HEALTH CARE EDUCATION/TRAINING PROGRAM

## 2025-05-10 PROCEDURE — 258N000003 HC RX IP 258 OP 636: Performed by: NURSE ANESTHETIST, CERTIFIED REGISTERED

## 2025-05-10 PROCEDURE — 83735 ASSAY OF MAGNESIUM: CPT | Performed by: INTERNAL MEDICINE

## 2025-05-10 PROCEDURE — 272N000001 HC OR GENERAL SUPPLY STERILE: Performed by: STUDENT IN AN ORGANIZED HEALTH CARE EDUCATION/TRAINING PROGRAM

## 2025-05-10 PROCEDURE — 258N000003 HC RX IP 258 OP 636

## 2025-05-10 PROCEDURE — 250N000025 HC SEVOFLURANE, PER MIN: Performed by: STUDENT IN AN ORGANIZED HEALTH CARE EDUCATION/TRAINING PROGRAM

## 2025-05-10 PROCEDURE — 84100 ASSAY OF PHOSPHORUS: CPT | Performed by: INTERNAL MEDICINE

## 2025-05-10 PROCEDURE — 85610 PROTHROMBIN TIME: CPT | Performed by: INTERNAL MEDICINE

## 2025-05-10 PROCEDURE — 258N000003 HC RX IP 258 OP 636: Performed by: ANESTHESIOLOGY

## 2025-05-10 PROCEDURE — 120N000001 HC R&B MED SURG/OB

## 2025-05-10 PROCEDURE — 710N000009 HC RECOVERY PHASE 1, LEVEL 1, PER MIN: Performed by: STUDENT IN AN ORGANIZED HEALTH CARE EDUCATION/TRAINING PROGRAM

## 2025-05-10 PROCEDURE — 360N000077 HC SURGERY LEVEL 4, PER MIN: Performed by: STUDENT IN AN ORGANIZED HEALTH CARE EDUCATION/TRAINING PROGRAM

## 2025-05-10 PROCEDURE — 250N000011 HC RX IP 250 OP 636: Mod: JZ

## 2025-05-10 PROCEDURE — 99233 SBSQ HOSP IP/OBS HIGH 50: CPT | Performed by: INTERNAL MEDICINE

## 2025-05-10 PROCEDURE — 999N000141 HC STATISTIC PRE-PROCEDURE NURSING ASSESSMENT: Performed by: STUDENT IN AN ORGANIZED HEALTH CARE EDUCATION/TRAINING PROGRAM

## 2025-05-10 PROCEDURE — 80048 BASIC METABOLIC PNL TOTAL CA: CPT | Performed by: INTERNAL MEDICINE

## 2025-05-10 PROCEDURE — 85014 HEMATOCRIT: CPT | Performed by: STUDENT IN AN ORGANIZED HEALTH CARE EDUCATION/TRAINING PROGRAM

## 2025-05-10 PROCEDURE — 250N000011 HC RX IP 250 OP 636: Mod: JZ | Performed by: ANESTHESIOLOGY

## 2025-05-10 PROCEDURE — 250N000013 HC RX MED GY IP 250 OP 250 PS 637: Performed by: INTERNAL MEDICINE

## 2025-05-10 PROCEDURE — 85610 PROTHROMBIN TIME: CPT | Performed by: STUDENT IN AN ORGANIZED HEALTH CARE EDUCATION/TRAINING PROGRAM

## 2025-05-10 PROCEDURE — 250N000011 HC RX IP 250 OP 636: Performed by: NURSE ANESTHETIST, CERTIFIED REGISTERED

## 2025-05-10 PROCEDURE — C1776 JOINT DEVICE (IMPLANTABLE): HCPCS | Performed by: STUDENT IN AN ORGANIZED HEALTH CARE EDUCATION/TRAINING PROGRAM

## 2025-05-10 PROCEDURE — 250N000011 HC RX IP 250 OP 636: Mod: JZ | Performed by: STUDENT IN AN ORGANIZED HEALTH CARE EDUCATION/TRAINING PROGRAM

## 2025-05-10 PROCEDURE — 250N000009 HC RX 250: Performed by: NURSE ANESTHETIST, CERTIFIED REGISTERED

## 2025-05-10 PROCEDURE — 250N000011 HC RX IP 250 OP 636: Performed by: STUDENT IN AN ORGANIZED HEALTH CARE EDUCATION/TRAINING PROGRAM

## 2025-05-10 PROCEDURE — 258N000003 HC RX IP 258 OP 636: Performed by: STUDENT IN AN ORGANIZED HEALTH CARE EDUCATION/TRAINING PROGRAM

## 2025-05-10 PROCEDURE — 86900 BLOOD TYPING SEROLOGIC ABO: CPT | Performed by: INTERNAL MEDICINE

## 2025-05-10 PROCEDURE — 250N000013 HC RX MED GY IP 250 OP 250 PS 637: Performed by: STUDENT IN AN ORGANIZED HEALTH CARE EDUCATION/TRAINING PROGRAM

## 2025-05-10 PROCEDURE — C1713 ANCHOR/SCREW BN/BN,TIS/BN: HCPCS | Performed by: STUDENT IN AN ORGANIZED HEALTH CARE EDUCATION/TRAINING PROGRAM

## 2025-05-10 DEVICE — ARTICUL/EZE FEMORAL HEAD DIAMETER 28MM +8.5 12/14 TAPER
Type: IMPLANTABLE DEVICE | Site: HIP | Status: FUNCTIONAL
Brand: ARTICUL/EZE

## 2025-05-10 DEVICE — SELF CENTERING BI-POLAR HEAD 28MM ID 48MM OD
Type: IMPLANTABLE DEVICE | Site: HIP | Status: FUNCTIONAL
Brand: SELF CENTERING

## 2025-05-10 DEVICE — IMP BONE CEMENT STRK SIMPLEX TOBRAMYCIN 40CC 6197-9-001: Type: IMPLANTABLE DEVICE | Site: HIP | Status: FUNCTIONAL

## 2025-05-10 DEVICE — SUMMIT FEMORAL STEM 12/14 TAPER CEMENTED SIZE 5 STD 120MM
Type: IMPLANTABLE DEVICE | Site: HIP | Status: FUNCTIONAL
Brand: SUMMIT

## 2025-05-10 DEVICE — CEMENTRALIZER STEM CENTRALIZER 10.0MM CEMENTED
Type: IMPLANTABLE DEVICE | Site: HIP | Status: FUNCTIONAL
Brand: CEMENTRALIZER

## 2025-05-10 DEVICE — PLUG CEMENT BUCK W/INSERT 25MM 7127-9423: Type: IMPLANTABLE DEVICE | Site: HIP | Status: FUNCTIONAL

## 2025-05-10 RX ORDER — DIPHENHYDRAMINE HYDROCHLORIDE 50 MG/ML
12.5 INJECTION, SOLUTION INTRAMUSCULAR; INTRAVENOUS EVERY 6 HOURS PRN
Status: DISCONTINUED | OUTPATIENT
Start: 2025-05-10 | End: 2025-05-10 | Stop reason: HOSPADM

## 2025-05-10 RX ORDER — HYDROMORPHONE HCL IN WATER/PF 6 MG/30 ML
0.4 PATIENT CONTROLLED ANALGESIA SYRINGE INTRAVENOUS
Status: DISCONTINUED | OUTPATIENT
Start: 2025-05-10 | End: 2025-05-13 | Stop reason: HOSPADM

## 2025-05-10 RX ORDER — MEPERIDINE HYDROCHLORIDE 25 MG/ML
12.5 INJECTION INTRAMUSCULAR; INTRAVENOUS; SUBCUTANEOUS EVERY 5 MIN PRN
Refills: 0 | Status: DISCONTINUED | OUTPATIENT
Start: 2025-05-10 | End: 2025-05-10 | Stop reason: HOSPADM

## 2025-05-10 RX ORDER — LIDOCAINE 40 MG/G
CREAM TOPICAL
Status: DISCONTINUED | OUTPATIENT
Start: 2025-05-10 | End: 2025-05-11

## 2025-05-10 RX ORDER — FENTANYL CITRATE 50 UG/ML
INJECTION, SOLUTION INTRAMUSCULAR; INTRAVENOUS PRN
Status: DISCONTINUED | OUTPATIENT
Start: 2025-05-10 | End: 2025-05-10

## 2025-05-10 RX ORDER — AMOXICILLIN 250 MG
2 CAPSULE ORAL 2 TIMES DAILY PRN
Status: DISCONTINUED | OUTPATIENT
Start: 2025-05-10 | End: 2025-05-13 | Stop reason: HOSPADM

## 2025-05-10 RX ORDER — NALOXONE HYDROCHLORIDE 0.4 MG/ML
0.2 INJECTION, SOLUTION INTRAMUSCULAR; INTRAVENOUS; SUBCUTANEOUS
Status: DISCONTINUED | OUTPATIENT
Start: 2025-05-10 | End: 2025-05-13 | Stop reason: HOSPADM

## 2025-05-10 RX ORDER — SODIUM CHLORIDE, SODIUM LACTATE, POTASSIUM CHLORIDE, CALCIUM CHLORIDE 600; 310; 30; 20 MG/100ML; MG/100ML; MG/100ML; MG/100ML
INJECTION, SOLUTION INTRAVENOUS CONTINUOUS PRN
Status: DISCONTINUED | OUTPATIENT
Start: 2025-05-10 | End: 2025-05-10

## 2025-05-10 RX ORDER — HYDROMORPHONE HCL IN WATER/PF 6 MG/30 ML
0.4 PATIENT CONTROLLED ANALGESIA SYRINGE INTRAVENOUS EVERY 5 MIN PRN
Refills: 0 | Status: DISCONTINUED | OUTPATIENT
Start: 2025-05-10 | End: 2025-05-10 | Stop reason: HOSPADM

## 2025-05-10 RX ORDER — CALCIUM CARBONATE 500 MG/1
1000 TABLET, CHEWABLE ORAL 4 TIMES DAILY PRN
Status: DISCONTINUED | OUTPATIENT
Start: 2025-05-10 | End: 2025-05-13 | Stop reason: HOSPADM

## 2025-05-10 RX ORDER — ONDANSETRON 2 MG/ML
4 INJECTION INTRAMUSCULAR; INTRAVENOUS EVERY 6 HOURS PRN
Status: DISCONTINUED | OUTPATIENT
Start: 2025-05-10 | End: 2025-05-11

## 2025-05-10 RX ORDER — ACETAMINOPHEN 325 MG/1
650 TABLET ORAL EVERY 4 HOURS PRN
Qty: 100 TABLET | Refills: 0 | Status: SHIPPED | OUTPATIENT
Start: 2025-05-10

## 2025-05-10 RX ORDER — SODIUM CHLORIDE 9 MG/ML
INJECTION, SOLUTION INTRAVENOUS CONTINUOUS
Status: DISCONTINUED | OUTPATIENT
Start: 2025-05-10 | End: 2025-05-11

## 2025-05-10 RX ORDER — HYDROMORPHONE HCL IN WATER/PF 6 MG/30 ML
0.2 PATIENT CONTROLLED ANALGESIA SYRINGE INTRAVENOUS EVERY 5 MIN PRN
Refills: 0 | Status: DISCONTINUED | OUTPATIENT
Start: 2025-05-10 | End: 2025-05-10 | Stop reason: HOSPADM

## 2025-05-10 RX ORDER — ONDANSETRON 2 MG/ML
INJECTION INTRAMUSCULAR; INTRAVENOUS PRN
Status: DISCONTINUED | OUTPATIENT
Start: 2025-05-10 | End: 2025-05-10

## 2025-05-10 RX ORDER — AMOXICILLIN 250 MG
1-2 CAPSULE ORAL 2 TIMES DAILY
Qty: 30 TABLET | Refills: 0 | Status: SHIPPED | OUTPATIENT
Start: 2025-05-10

## 2025-05-10 RX ORDER — KETOROLAC TROMETHAMINE 30 MG/ML
INJECTION, SOLUTION INTRAMUSCULAR; INTRAVENOUS PRN
Status: DISCONTINUED | OUTPATIENT
Start: 2025-05-10 | End: 2025-05-10

## 2025-05-10 RX ORDER — POLYETHYLENE GLYCOL 3350 17 G/17G
1 POWDER, FOR SOLUTION ORAL DAILY
Qty: 7 PACKET | Refills: 0 | Status: SHIPPED | OUTPATIENT
Start: 2025-05-10

## 2025-05-10 RX ORDER — CEFAZOLIN SODIUM/WATER 2 G/20 ML
2 SYRINGE (ML) INTRAVENOUS SEE ADMIN INSTRUCTIONS
Status: DISCONTINUED | OUTPATIENT
Start: 2025-05-10 | End: 2025-05-10 | Stop reason: HOSPADM

## 2025-05-10 RX ORDER — ACETAMINOPHEN 325 MG/1
650 TABLET ORAL EVERY 4 HOURS PRN
Status: DISCONTINUED | OUTPATIENT
Start: 2025-05-10 | End: 2025-05-13 | Stop reason: HOSPADM

## 2025-05-10 RX ORDER — OXYCODONE HYDROCHLORIDE 5 MG/1
5 TABLET ORAL EVERY 4 HOURS PRN
Status: DISCONTINUED | OUTPATIENT
Start: 2025-05-10 | End: 2025-05-13 | Stop reason: HOSPADM

## 2025-05-10 RX ORDER — LEVOTHYROXINE SODIUM 137 UG/1
137 TABLET ORAL
COMMUNITY
Start: 2025-05-09

## 2025-05-10 RX ORDER — FENTANYL CITRATE 50 UG/ML
25 INJECTION, SOLUTION INTRAMUSCULAR; INTRAVENOUS EVERY 5 MIN PRN
Refills: 0 | Status: DISCONTINUED | OUTPATIENT
Start: 2025-05-10 | End: 2025-05-10 | Stop reason: HOSPADM

## 2025-05-10 RX ORDER — CEFAZOLIN SODIUM/WATER 2 G/20 ML
SYRINGE (ML) INTRAVENOUS
Status: DISCONTINUED
Start: 2025-05-10 | End: 2025-05-10 | Stop reason: HOSPADM

## 2025-05-10 RX ORDER — CEPHALEXIN 500 MG/1
500 CAPSULE ORAL EVERY 6 HOURS SCHEDULED
Status: DISCONTINUED | OUTPATIENT
Start: 2025-05-11 | End: 2025-05-11 | Stop reason: ALTCHOICE

## 2025-05-10 RX ORDER — OXYCODONE HYDROCHLORIDE 5 MG/1
10 TABLET ORAL EVERY 4 HOURS PRN
Status: DISCONTINUED | OUTPATIENT
Start: 2025-05-10 | End: 2025-05-13 | Stop reason: HOSPADM

## 2025-05-10 RX ORDER — ASPIRIN 81 MG/1
81 TABLET ORAL 2 TIMES DAILY
Qty: 60 TABLET | Refills: 0 | Status: SHIPPED | OUTPATIENT
Start: 2025-05-10

## 2025-05-10 RX ORDER — MAGNESIUM HYDROXIDE 1200 MG/15ML
LIQUID ORAL PRN
Status: DISCONTINUED | OUTPATIENT
Start: 2025-05-10 | End: 2025-05-10 | Stop reason: HOSPADM

## 2025-05-10 RX ORDER — ONDANSETRON 4 MG/1
4 TABLET, ORALLY DISINTEGRATING ORAL EVERY 6 HOURS PRN
Status: DISCONTINUED | OUTPATIENT
Start: 2025-05-10 | End: 2025-05-13 | Stop reason: HOSPADM

## 2025-05-10 RX ORDER — SODIUM CHLORIDE, SODIUM LACTATE, POTASSIUM CHLORIDE, CALCIUM CHLORIDE 600; 310; 30; 20 MG/100ML; MG/100ML; MG/100ML; MG/100ML
INJECTION, SOLUTION INTRAVENOUS CONTINUOUS
Status: DISCONTINUED | OUTPATIENT
Start: 2025-05-10 | End: 2025-05-10 | Stop reason: HOSPADM

## 2025-05-10 RX ORDER — SODIUM CHLORIDE, SODIUM LACTATE, POTASSIUM CHLORIDE, CALCIUM CHLORIDE 600; 310; 30; 20 MG/100ML; MG/100ML; MG/100ML; MG/100ML
INJECTION, SOLUTION INTRAVENOUS CONTINUOUS
Status: DISCONTINUED | OUTPATIENT
Start: 2025-05-10 | End: 2025-05-11

## 2025-05-10 RX ORDER — FENTANYL CITRATE 50 UG/ML
50 INJECTION, SOLUTION INTRAMUSCULAR; INTRAVENOUS EVERY 5 MIN PRN
Refills: 0 | Status: DISCONTINUED | OUTPATIENT
Start: 2025-05-10 | End: 2025-05-10 | Stop reason: HOSPADM

## 2025-05-10 RX ORDER — NALOXONE HYDROCHLORIDE 0.4 MG/ML
0.4 INJECTION, SOLUTION INTRAMUSCULAR; INTRAVENOUS; SUBCUTANEOUS
Status: DISCONTINUED | OUTPATIENT
Start: 2025-05-10 | End: 2025-05-13 | Stop reason: HOSPADM

## 2025-05-10 RX ORDER — LABETALOL HYDROCHLORIDE 5 MG/ML
10 INJECTION, SOLUTION INTRAVENOUS
Status: DISCONTINUED | OUTPATIENT
Start: 2025-05-10 | End: 2025-05-10 | Stop reason: HOSPADM

## 2025-05-10 RX ORDER — DIPHENHYDRAMINE HCL 12.5 MG/5ML
12.5 SOLUTION ORAL EVERY 6 HOURS PRN
Status: DISCONTINUED | OUTPATIENT
Start: 2025-05-10 | End: 2025-05-10 | Stop reason: HOSPADM

## 2025-05-10 RX ORDER — ONDANSETRON 2 MG/ML
4 INJECTION INTRAMUSCULAR; INTRAVENOUS EVERY 30 MIN PRN
Status: DISCONTINUED | OUTPATIENT
Start: 2025-05-10 | End: 2025-05-10 | Stop reason: HOSPADM

## 2025-05-10 RX ORDER — CEFAZOLIN SODIUM/WATER 2 G/20 ML
2 SYRINGE (ML) INTRAVENOUS
Status: COMPLETED | OUTPATIENT
Start: 2025-05-10 | End: 2025-05-10

## 2025-05-10 RX ORDER — ACETAMINOPHEN 325 MG/1
975 TABLET ORAL EVERY 8 HOURS
Status: DISCONTINUED | OUTPATIENT
Start: 2025-05-10 | End: 2025-05-13 | Stop reason: HOSPADM

## 2025-05-10 RX ORDER — PROCHLORPERAZINE MALEATE 5 MG/1
5 TABLET ORAL EVERY 6 HOURS PRN
Status: DISCONTINUED | OUTPATIENT
Start: 2025-05-10 | End: 2025-05-11

## 2025-05-10 RX ORDER — DEXAMETHASONE SODIUM PHOSPHATE 4 MG/ML
INJECTION, SOLUTION INTRA-ARTICULAR; INTRALESIONAL; INTRAMUSCULAR; INTRAVENOUS; SOFT TISSUE PRN
Status: DISCONTINUED | OUTPATIENT
Start: 2025-05-10 | End: 2025-05-10

## 2025-05-10 RX ORDER — HYDROMORPHONE HCL IN WATER/PF 6 MG/30 ML
0.2 PATIENT CONTROLLED ANALGESIA SYRINGE INTRAVENOUS
Status: DISCONTINUED | OUTPATIENT
Start: 2025-05-10 | End: 2025-05-13 | Stop reason: HOSPADM

## 2025-05-10 RX ORDER — PROPOFOL 10 MG/ML
INJECTION, EMULSION INTRAVENOUS PRN
Status: DISCONTINUED | OUTPATIENT
Start: 2025-05-10 | End: 2025-05-10

## 2025-05-10 RX ORDER — DEXAMETHASONE SODIUM PHOSPHATE 10 MG/ML
4 INJECTION, SOLUTION INTRAMUSCULAR; INTRAVENOUS
Status: DISCONTINUED | OUTPATIENT
Start: 2025-05-10 | End: 2025-05-10 | Stop reason: HOSPADM

## 2025-05-10 RX ORDER — BISACODYL 10 MG
10 SUPPOSITORY, RECTAL RECTAL DAILY PRN
Status: DISCONTINUED | OUTPATIENT
Start: 2025-05-10 | End: 2025-05-13 | Stop reason: HOSPADM

## 2025-05-10 RX ORDER — ACETAMINOPHEN 650 MG/1
650 SUPPOSITORY RECTAL EVERY 4 HOURS PRN
Status: DISCONTINUED | OUTPATIENT
Start: 2025-05-10 | End: 2025-05-13 | Stop reason: HOSPADM

## 2025-05-10 RX ORDER — AMOXICILLIN 250 MG
1 CAPSULE ORAL 2 TIMES DAILY PRN
Status: DISCONTINUED | OUTPATIENT
Start: 2025-05-10 | End: 2025-05-13 | Stop reason: HOSPADM

## 2025-05-10 RX ORDER — CEFAZOLIN SODIUM 2 G/50ML
2 SOLUTION INTRAVENOUS EVERY 8 HOURS
Status: COMPLETED | OUTPATIENT
Start: 2025-05-10 | End: 2025-05-11

## 2025-05-10 RX ORDER — ONDANSETRON 2 MG/ML
4 INJECTION INTRAMUSCULAR; INTRAVENOUS EVERY 6 HOURS PRN
Status: DISCONTINUED | OUTPATIENT
Start: 2025-05-10 | End: 2025-05-13 | Stop reason: HOSPADM

## 2025-05-10 RX ORDER — KETOROLAC TROMETHAMINE 15 MG/ML
15 INJECTION, SOLUTION INTRAMUSCULAR; INTRAVENOUS EVERY 6 HOURS
Status: COMPLETED | OUTPATIENT
Start: 2025-05-10 | End: 2025-05-11

## 2025-05-10 RX ORDER — LIDOCAINE HYDROCHLORIDE 10 MG/ML
INJECTION, SOLUTION INFILTRATION; PERINEURAL PRN
Status: DISCONTINUED | OUTPATIENT
Start: 2025-05-10 | End: 2025-05-10

## 2025-05-10 RX ORDER — ASPIRIN 81 MG/1
81 TABLET ORAL 2 TIMES DAILY
Status: DISCONTINUED | OUTPATIENT
Start: 2025-05-10 | End: 2025-05-13 | Stop reason: HOSPADM

## 2025-05-10 RX ORDER — LIDOCAINE 40 MG/G
CREAM TOPICAL
Status: DISCONTINUED | OUTPATIENT
Start: 2025-05-10 | End: 2025-05-13 | Stop reason: HOSPADM

## 2025-05-10 RX ORDER — NALOXONE HYDROCHLORIDE 0.4 MG/ML
0.1 INJECTION, SOLUTION INTRAMUSCULAR; INTRAVENOUS; SUBCUTANEOUS
Status: DISCONTINUED | OUTPATIENT
Start: 2025-05-10 | End: 2025-05-10 | Stop reason: HOSPADM

## 2025-05-10 RX ORDER — ONDANSETRON 4 MG/1
4 TABLET, ORALLY DISINTEGRATING ORAL EVERY 6 HOURS PRN
Status: DISCONTINUED | OUTPATIENT
Start: 2025-05-10 | End: 2025-05-11

## 2025-05-10 RX ORDER — POLYETHYLENE GLYCOL 3350 17 G/17G
17 POWDER, FOR SOLUTION ORAL DAILY
Status: DISCONTINUED | OUTPATIENT
Start: 2025-05-11 | End: 2025-05-13 | Stop reason: HOSPADM

## 2025-05-10 RX ORDER — CEFADROXIL 500 MG/1
500 CAPSULE ORAL 2 TIMES DAILY
Qty: 6 CAPSULE | Refills: 0 | Status: SHIPPED | OUTPATIENT
Start: 2025-05-10 | End: 2025-05-14

## 2025-05-10 RX ORDER — PROCHLORPERAZINE MALEATE 5 MG/1
5 TABLET ORAL EVERY 6 HOURS PRN
Status: DISCONTINUED | OUTPATIENT
Start: 2025-05-10 | End: 2025-05-13 | Stop reason: HOSPADM

## 2025-05-10 RX ORDER — AMOXICILLIN 250 MG
1 CAPSULE ORAL 2 TIMES DAILY
Status: DISCONTINUED | OUTPATIENT
Start: 2025-05-10 | End: 2025-05-13 | Stop reason: HOSPADM

## 2025-05-10 RX ORDER — ONDANSETRON 4 MG/1
4 TABLET, ORALLY DISINTEGRATING ORAL EVERY 30 MIN PRN
Status: DISCONTINUED | OUTPATIENT
Start: 2025-05-10 | End: 2025-05-10 | Stop reason: HOSPADM

## 2025-05-10 RX ADMIN — KETOROLAC TROMETHAMINE 15 MG: 15 INJECTION, SOLUTION INTRAMUSCULAR; INTRAVENOUS at 19:51

## 2025-05-10 RX ADMIN — SODIUM CHLORIDE, SODIUM LACTATE, POTASSIUM CHLORIDE, AND CALCIUM CHLORIDE: .6; .31; .03; .02 INJECTION, SOLUTION INTRAVENOUS at 11:03

## 2025-05-10 RX ADMIN — ROCURONIUM 50 MG: 50 INJECTION, SOLUTION INTRAVENOUS at 11:04

## 2025-05-10 RX ADMIN — SUGAMMADEX 200 MG: 100 INJECTION, SOLUTION INTRAVENOUS at 13:03

## 2025-05-10 RX ADMIN — ACETAMINOPHEN 975 MG: 325 TABLET ORAL at 17:12

## 2025-05-10 RX ADMIN — SODIUM CHLORIDE, SODIUM LACTATE, POTASSIUM CHLORIDE, AND CALCIUM CHLORIDE: .6; .31; .03; .02 INJECTION, SOLUTION INTRAVENOUS at 15:55

## 2025-05-10 RX ADMIN — PROPOFOL 100 MG: 10 INJECTION, EMULSION INTRAVENOUS at 11:04

## 2025-05-10 RX ADMIN — PHENYLEPHRINE HYDROCHLORIDE 0.4 MCG/KG/MIN: 10 INJECTION INTRAVENOUS at 11:24

## 2025-05-10 RX ADMIN — PHENYLEPHRINE HYDROCHLORIDE 100 MCG: 10 INJECTION INTRAVENOUS at 11:04

## 2025-05-10 RX ADMIN — FENTANYL CITRATE 50 MCG: 50 INJECTION, SOLUTION INTRAMUSCULAR; INTRAVENOUS at 11:04

## 2025-05-10 RX ADMIN — FENTANYL CITRATE 25 MCG: 50 INJECTION, SOLUTION INTRAMUSCULAR; INTRAVENOUS at 14:01

## 2025-05-10 RX ADMIN — SODIUM CHLORIDE, SODIUM LACTATE, POTASSIUM CHLORIDE, AND CALCIUM CHLORIDE: .6; .31; .03; .02 INJECTION, SOLUTION INTRAVENOUS at 13:15

## 2025-05-10 RX ADMIN — Medication 2 G: at 11:03

## 2025-05-10 RX ADMIN — TRANEXAMIC ACID 1 G: 1 INJECTION, SOLUTION INTRAVENOUS at 11:15

## 2025-05-10 RX ADMIN — ACETAMINOPHEN 975 MG: 325 TABLET ORAL at 23:54

## 2025-05-10 RX ADMIN — SODIUM CHLORIDE: 0.9 INJECTION, SOLUTION INTRAVENOUS at 01:37

## 2025-05-10 RX ADMIN — SENNOSIDES AND DOCUSATE SODIUM 1 TABLET: 50; 8.6 TABLET ORAL at 22:09

## 2025-05-10 RX ADMIN — FENTANYL CITRATE 50 MCG: 50 INJECTION, SOLUTION INTRAMUSCULAR; INTRAVENOUS at 12:42

## 2025-05-10 RX ADMIN — HYDROMORPHONE HYDROCHLORIDE 0.2 MG: 0.2 INJECTION, SOLUTION INTRAMUSCULAR; INTRAVENOUS; SUBCUTANEOUS at 08:09

## 2025-05-10 RX ADMIN — FENTANYL CITRATE 50 MCG: 50 INJECTION, SOLUTION INTRAMUSCULAR; INTRAVENOUS at 14:26

## 2025-05-10 RX ADMIN — DEXAMETHASONE SODIUM PHOSPHATE 10 MG: 4 INJECTION, SOLUTION INTRA-ARTICULAR; INTRALESIONAL; INTRAMUSCULAR; INTRAVENOUS; SOFT TISSUE at 11:15

## 2025-05-10 RX ADMIN — CEFAZOLIN SODIUM 2 G: 2 SOLUTION INTRAVENOUS at 19:51

## 2025-05-10 RX ADMIN — ONDANSETRON 4 MG: 2 INJECTION INTRAMUSCULAR; INTRAVENOUS at 11:15

## 2025-05-10 RX ADMIN — KETOROLAC TROMETHAMINE 15 MG: 15 INJECTION, SOLUTION INTRAMUSCULAR; INTRAVENOUS at 23:53

## 2025-05-10 RX ADMIN — LIDOCAINE HYDROCHLORIDE 5 ML: 10 INJECTION, SOLUTION INFILTRATION; PERINEURAL at 11:04

## 2025-05-10 RX ADMIN — ASPIRIN 81 MG: 81 TABLET, COATED ORAL at 22:08

## 2025-05-10 RX ADMIN — KETOROLAC TROMETHAMINE 15 MG: 30 INJECTION, SOLUTION INTRAMUSCULAR at 12:30

## 2025-05-10 RX ADMIN — HYDROMORPHONE HYDROCHLORIDE 0.2 MG: 0.2 INJECTION, SOLUTION INTRAMUSCULAR; INTRAVENOUS; SUBCUTANEOUS at 03:33

## 2025-05-10 RX ADMIN — SODIUM CHLORIDE, SODIUM LACTATE, POTASSIUM CHLORIDE, AND CALCIUM CHLORIDE: .6; .31; .03; .02 INJECTION, SOLUTION INTRAVENOUS at 22:07

## 2025-05-10 RX ADMIN — ROCURONIUM 20 MG: 50 INJECTION, SOLUTION INTRAVENOUS at 12:18

## 2025-05-10 RX ADMIN — FENTANYL CITRATE 25 MCG: 50 INJECTION, SOLUTION INTRAMUSCULAR; INTRAVENOUS at 14:07

## 2025-05-10 RX ADMIN — LEVOTHYROXINE SODIUM 137 MCG: 0.14 TABLET ORAL at 08:09

## 2025-05-10 ASSESSMENT — ACTIVITIES OF DAILY LIVING (ADL)
ADLS_ACUITY_SCORE: 45
ADLS_ACUITY_SCORE: 54
ADLS_ACUITY_SCORE: 47
ADLS_ACUITY_SCORE: 45
ADLS_ACUITY_SCORE: 45
ADLS_ACUITY_SCORE: 49
ADLS_ACUITY_SCORE: 45
ADLS_ACUITY_SCORE: 49
ADLS_ACUITY_SCORE: 47
ADLS_ACUITY_SCORE: 62
ADLS_ACUITY_SCORE: 54
ADLS_ACUITY_SCORE: 47
ADLS_ACUITY_SCORE: 62
ADLS_ACUITY_SCORE: 54
ADLS_ACUITY_SCORE: 49
ADLS_ACUITY_SCORE: 44
ADLS_ACUITY_SCORE: 54
ADLS_ACUITY_SCORE: 41
ADLS_ACUITY_SCORE: 47
ADLS_ACUITY_SCORE: 49
ADLS_ACUITY_SCORE: 44

## 2025-05-10 NOTE — H&P
[facility]    History and Physical - Hospitalist Service       Date of Admission:  5/9/2025    Assessment & Plan      Ophelia Spangler is a 80 year old female admitted on 5/9/2025. She has PMH most notable for dementia and hypothyroidism that presents with right hip pain and found to have a right femoral neck fracture.    Med rec not done at the time of admission.  Please reorder patient's PTA medications once med rec has been completed.      #Right femoral neck fracture  - Orthopedic consult  - N.p.o.  - Bedrest  - Pain control with Tylenol, oxycodone, IV Dilaudid  - Will obtain type and screen and INR  - EKG with sinus rhythm, no contiguous ST segment elevation/depressions or diffuse T wave inversions.  The patient denies chest pain.  - The patient does not meet 4 METS however delaying surgery will most likely result in further morbidity.  She appears to be optimized for surgery and can proceed to the OR at the discretion of orthopedics and anesthesia.   - The patient's sister plans to be at the hospital early in the morning to help guide care    #Leukocytosis  - Likely stress demargination in the setting of right femoral neck fracture, no findings concerning for infection    #Unwitnessed fall  The patient had unwitnessed fall at her facility.  CT head and C-spine without acute findings.  X-ray right knee negative.  On exam she has no obvious head trauma or C-spine tenderness.  She can flex and extend her elbows, wrists, left knee and left hip without pain.  No other obvious signs of trauma.  - Fall precautions    #Dementia  - Continue PTA donepezil    #Hypothyroidism  - Continue PTA Synthroid          Diet: NPO for Procedure/Surgery per Anesthesia Guidelines Except for: Meds; Clear liquids before procedure/surgery: ADULT (Age GREATER than or Equal to 18 years) - Clear liquids 2 hours before procedure/surgery, start MIVF with NS at 100 mL/hour for 1 day  DVT Prophylaxis: Pneumatic  "Compression Devices  Valiente Catheter: Not present  Lines: None     Cardiac Monitoring: None  Code Status: DNR/DNI.  The patient came with a POLST and an advance directive which stated DNR/DNI.    Clinically Significant Risk Factors Present on Admission                             # Obesity: Estimated body mass index is 31.32 kg/m  as calculated from the following:    Height as of this encounter: 1.702 m (5' 7\").    Weight as of this encounter: 90.7 kg (200 lb).              Disposition Plan     Medically Ready for Discharge: Anticipated in 2-4 Days           Thierry Perez MD  Hospitalist Service  [facility]  Securely message with [employer] (more info)  Text page via [facility] Paging/Directory     ______________________________________________________________________    Chief Complaint   \".......\"    History is limited due to the patient's dementia.    History of Present Illness   Ophelia Spangler is a 80 year old female who has PMH most notable for hypothyroidism and dementia the presents with an unwitnessed fall and right hip pain.    The patient had an unwitnessed fall at her care facility.  She had acute onset of right hip pain and was brought to the emergency department via EMS.  She was found to have a right femoral neck fracture.  During the interview the patient endorses \"pain all over.\"  She denies chest pain, dyspnea.  She does not remember the events surrounding the fall.    The patient has advance care planning paperwork with her.  She has an advanced directive and a POLST which states she is DNR/DNI.      Past Medical History    No past medical history on file.    Past Surgical History   No past surgical history on file.    Prior to Admission Medications   Prior to Admission Medications   Prescriptions Last Dose Informant Patient Reported? Taking?   donepezil (ARICEPT) 5 MG tablet   Yes No   Sig: Take 1 tablet by mouth daily   Patient not taking: Reported on 10/25/2024 "   donepezil (ARICEPT) 5 MG tablet   No No   Sig: Take 1 tablet (5 mg) by mouth at bedtime.   levothyroxine (SYNTHROID/LEVOTHROID) 112 MCG tablet   No No   Sig: Take 1 tablet (112 mcg) by mouth daily.      Facility-Administered Medications: None        Review of Systems    The 10 point Review of Systems is negative other than noted in the HPI or here.     Social History   I have reviewed this patient's social history and updated it with pertinent information if needed.  Social History     Tobacco Use    Smoking status: Never    Smokeless tobacco: Never   Vaping Use    Vaping status: Never Used         Family History     Unable to obtain due to: Dementia      Allergies   Allergies   Allergen Reactions    Sulfamethoxazole-Trimethoprim Anaphylaxis     Could be a reaction with the Cipro also.      Could be a reaction with the Cipro also.    Oxybutynin Swelling and Other (See Comments)     Comment: numbness around mouth, Description:    Sulfamethoxazole Other (See Comments)     Comment:  , Description:    Tolterodine Other (See Comments)     Blurred vision    Trimethoprim Other (See Comments)     Comment:  , Description:    Ciprofloxacin Itching, Other (See Comments) and Rash     Comment:  , Description:    Soap Rash        Physical Exam   Vital Signs: Temp: 98.3  F (36.8  C) Temp src: Oral BP: (!) 140/66 Pulse: 67   Resp: 16 SpO2: 98 % O2 Device: None (Room air)    Weight: 200 lbs 0 oz    GENERAL: Lying in bed, no distress, appears stated age   HEENT: NC/AT, sclera anicteric   NECK: No C spine tenderness   CV: RRR, no M/R/G, CR < 2 s   PULM: CTAB, no wheezes, rales, rhonchi   GI: Abd soft, NT, ND  NEURO: Awake, alert, oriented to name only, CN II-XII grossly intact, ABRAHAM, appears nonfocal  SKIN: no rash     MSK: No obvious head trauma or C-spine tenderness.  She can flex and extend her elbows, wrists, left knee and left hip without pain.  No other obvious signs of trauma.  Her right leg is shortened and externally rotated  as would be expected for a right femoral neck fracture        Medical Decision Making       70 MINUTES SPENT BY ME on the date of service doing chart review, history, exam, documentation & further activities per the note.      Data     I have personally reviewed the following data over the past 24 hrs:    15.0 (H)  \   12.7   / 209     140 106 19.9 /  117 (H)   3.7 28 0.62 \     ALT: 18 AST: 21 AP: 104 TBILI: 0.5   ALB: 3.9 TOT PROTEIN: 7.1 LIPASE: N/A       Imaging results reviewed over the past 24 hrs:   Recent Results (from the past 24 hours)   CT Head w/o Contrast    Narrative    EXAM: CT HEAD W/O CONTRAST, CT CERVICAL SPINE W/O CONTRAST  LOCATION: [facility]  DATE: 5/9/2025    INDICATION: trauma  COMPARISON: April 25, 2016  TECHNIQUE:   1) Routine CT Head without IV contrast. Multiplanar reformats. Dose reduction techniques were used.  2) Routine CT Cervical Spine without IV contrast. Multiplanar reformats. Dose reduction techniques were used.    FINDINGS:   HEAD CT:   INTRACRANIAL CONTENTS: No intracranial hemorrhage, extraaxial collection, or mass effect.  No CT evidence of acute infarct. Mild to moderate presumed chronic small vessel ischemic changes. Moderate generalized volume loss. No hydrocephalus.     VISUALIZED ORBITS/SINUSES/MASTOIDS: Prior bilateral cataract surgery. Visualized portions of the orbits are otherwise unremarkable. No paranasal sinus mucosal disease. No middle ear or mastoid effusion.    BONES/SOFT TISSUES: No acute abnormality.    CERVICAL SPINE CT:   VERTEBRA: Normal vertebral body heights and alignment. No fracture or posttraumatic subluxation.     CANAL/FORAMINA: No severe spinal canal stenosis. Moderate spinal canal stenosis C4-C5. Severe osseous foraminal stenosis left at C6-C7. Multiple moderate foraminal stenoses elsewhere.    PARASPINAL: No extraspinal abnormality. Visualized lung fields are clear.      Impression    IMPRESSION:  HEAD CT:  1.  No CT  evidence for acute intracranial process.  2.  Brain atrophy and presumed chronic microvascular ischemic changes as above.    CERVICAL SPINE CT:  1.  No CT evidence for acute fracture or post traumatic subluxation.  2.  Cervical spondylosis as above.     CT Cervical Spine w/o Contrast    Narrative    EXAM: CT HEAD W/O CONTRAST, CT CERVICAL SPINE W/O CONTRAST  LOCATION: [facility]  DATE: 5/9/2025    INDICATION: trauma  COMPARISON: April 25, 2016  TECHNIQUE:   1) Routine CT Head without IV contrast. Multiplanar reformats. Dose reduction techniques were used.  2) Routine CT Cervical Spine without IV contrast. Multiplanar reformats. Dose reduction techniques were used.    FINDINGS:   HEAD CT:   INTRACRANIAL CONTENTS: No intracranial hemorrhage, extraaxial collection, or mass effect.  No CT evidence of acute infarct. Mild to moderate presumed chronic small vessel ischemic changes. Moderate generalized volume loss. No hydrocephalus.     VISUALIZED ORBITS/SINUSES/MASTOIDS: Prior bilateral cataract surgery. Visualized portions of the orbits are otherwise unremarkable. No paranasal sinus mucosal disease. No middle ear or mastoid effusion.    BONES/SOFT TISSUES: No acute abnormality.    CERVICAL SPINE CT:   VERTEBRA: Normal vertebral body heights and alignment. No fracture or posttraumatic subluxation.     CANAL/FORAMINA: No severe spinal canal stenosis. Moderate spinal canal stenosis C4-C5. Severe osseous foraminal stenosis left at C6-C7. Multiple moderate foraminal stenoses elsewhere.    PARASPINAL: No extraspinal abnormality. Visualized lung fields are clear.      Impression    IMPRESSION:  HEAD CT:  1.  No CT evidence for acute intracranial process.  2.  Brain atrophy and presumed chronic microvascular ischemic changes as above.    CERVICAL SPINE CT:  1.  No CT evidence for acute fracture or post traumatic subluxation.  2.  Cervical spondylosis as above.     XR Pelvis and Hip Right 2 Views     Narrative    EXAM: XR PELVIS AND HIP RIGHT 2 VIEWS  LOCATION: [facility]  DATE: 5/9/2025    INDICATION: Trauma.  COMPARISON: None.      Impression    IMPRESSION: There is probably a right femoral neck fracture, but the views are unconventional and the hip is internally rotated, so assessment is limited. Repeat right hip radiographs or further evaluation with CT suggested. Degenerative changes in the   spine. Mild osteoarthrosis of the left SI joint.   XR Knee Right 1/2 Views    Narrative    EXAM: XR KNEE RIGHT 1/2 VIEWS  LOCATION: [facility]  DATE: 5/9/2025    INDICATION: Trauma  COMPARISON: None.      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 Diffuse bone demineralization. No fracture, effusion or calcified intra-articular body of the knee.   CT Hip Right w/o Contrast    Narrative    EXAM: CT HIP RIGHT W/O CONTRAST  LOCATION: [facility]  DATE: 5/9/2025    INDICATION: fall hip pain non diagnostic xray  COMPARISON: X-ray 5/9/2025  TECHNIQUE: Noncontrast. Axial, sagittal and coronal thin-section reconstruction. Dose reduction techniques were used.     FINDINGS:     BONES:  -There is a significantly angulated/displaced comminuted fracture involving the right femoral neck. No extension into the trochanters femoral head.    SOFT TISSUES:  -No significant hematoma identified.      Impression    IMPRESSION:  1.  Significant angulated/displaced comminuted fracture involving the right femoral neck.    2.  No significant hematoma identified.

## 2025-05-10 NOTE — INTERVAL H&P NOTE
"I have reviewed the surgical (or preoperative) H&P that is linked to this encounter, and examined the patient. There are no significant changes    Clinical Conditions Present on Arrival:  Clinically Significant Risk Factors Present on Admission            # Hyperchloremia: Highest Cl = 109 mmol/L in last 2 days, will monitor as appropriate              # Coagulation Defect: INR = 1.16 (Ref range: 0.85 - 1.15) and/or PTT = N/A, will monitor for bleeding             # Obesity: Estimated body mass index is 31.32 kg/m  as calculated from the following:    Height as of this encounter: 1.702 m (5' 7\").    Weight as of this encounter: 90.7 kg (200 lb).           "

## 2025-05-10 NOTE — PROGRESS NOTES
Brief Orthopedic Note:      By report and review of EMR, Ophelia is a pleasant 80 year old female w/Pmhx dementia, OA of Left shoulder, hypothyroidism who fell at memory care facility and noted Right hip pain which prompted ED visit today.     Imaging has demonstrated displaced femoral neck Right hip fracture. She is NVI diffusely to RLE, pain well controlled, and compartments soft, compressible and no open wounds but swelling, ecchymosis noted.  Evaluation and treatment ongoing, but no additional acute injuries noted at this time.     Ophelia's sister is DPOA and will be present at hospital early tomorrow/Saturday morning to help guide care.         Plan:   - Admit Hospital Medicine - optimize for OR (appreciate assistance)   - Bedrest / NWB RLE   - hold anticoag  - Pre-op labs/studies (CBC, BMP, INR, T&Screen, EKG, and additional per Hosp Med)  - NPO midnight  - Plan for OR Saturday vs Sunday with orthopedics (I.e. hemiarthroplasty).      Formal consult evaluation and note to be placed. Please reach out to orthopedics if there is any acute changes to condition, questions, or concerns.      Leland Yan MD  [facility]

## 2025-05-10 NOTE — ANESTHESIA PROCEDURE NOTES
Airway       Patient location during procedure: OR       Procedure Start/Stop Times: 5/10/2025 11:06 AM  Staff -        CRNA: Scar Lisa APRN CRNA       Performed By: CRNA  Consent for Airway        Urgency: elective  Indications and Patient Condition       Indications for airway management: riccardo-procedural         Mask difficulty assessment: 1 - vent by mask    Final Airway Details       Final airway type: endotracheal airway       Successful airway: ETT - single  Endotracheal Airway Details        ETT size (mm): 7.0       Successful intubation technique: video laryngoscopy       VL Blade Size: Glidescope 3       Grade View of Cords: 1       Adjucts: stylet       Position: Right       Measured from: gums/teeth       Secured at (cm): 21       Bite block used: None    Post intubation assessment        Placement verified by: capnometry, equal breath sounds and chest rise        Number of attempts at approach: 1       Number of other approaches attempted: 0       Secured with: tape       Ease of procedure: easy       Dentition: Unchanged and Intact    Medication(s) Administered   Medication Administration Time: 5/10/2025 11:06 AM

## 2025-05-10 NOTE — OP NOTE
PATIENT NAME: Ophelia Spangler    MEDICAL RECORD #: [id number]    DATE OF OPERATION: 5/10/2025    PREOPERATIVE DIAGNOSIS: Right displaced femoral neck fracture    POSTOPERATIVE DIAGNOSIS: Right displaced femoral neck fracture    OPERATION: Right hip hemiarthroplasty, anterolateral approach    SURGEON: Melecio Israel MD    ASSISTANT(S): Carie Tovar PA-C    A skilled assistant was required due to the nature of the case for patient position, retraction, exposure, implant placement, closure and dressing application.    ANESTHESIA:  General Endotracheal Anesthesia, local periarticular block    COMPLICATIONS: No Immediate    ANTIBIOTICS: Cefazolin in a weight based dose IV given within 1 hour prior to skin incision    OTHER MEDICATIONS:   Tranexemic acid positive for IV prior to procedure.  2. 100 mL periarticular injection of Ropivacaine 400 mg, Epinephrine 600mcg, Ketorolac 30mg.    EBL: 300 mL    FINDINGS:  Displaced fracture of the femoral neck.  Acetabular cartilage and labrum intact.  No distal propagation of the fracture.      IMPLANTS:  System: Depuy  Femoral Stem: Cemented Costilla size 5 standard offset  Femoral Head: 48 mm outer diameter, 28 mm inner diameter bipolar femoral head + 8.5 mm    PATIENT HISTORY:  The patient is a 80 year old year-old female with a past medical history of celiac disease, hypothyroidism, dementia who sustained a femoral neck fracture as a result of a fall.  After considering the patient's condition and the impact of their hip injury on the patient's quality of life and risks of nonoperative treatment, partial hip replacement was offered as a reasonable option.    Prior to the surgery I discussed the nature of the hip replacement surgery including alternatives to surgery and the purpose of, and indications for proceeding with surgery. I discussed that this surgery is a shared decision between the patient/family and the surgeon.    Risks and benefits and alternatives of  the procedure have been explained to the patient and their family.  Anesthesia complications and risks include but are not limited to stroke, heart attack, and death. The surgical risks include but are not limited to infection, instability/dislocation, bleeding, nerve and blood vessel injury, deep vein thrombosis, pulmonary embolus, stiffness, pain, scar, need for reoperation, leg length discrepancy, thigh numbness, weakness, and mechanical failure of the implant including loosening, metal complications, metal allergy, wear or breakage.  I discused the expected recovery from surgery and the importance of compliance with all our pre and post-operative recommendations in order to maximize the recovery. The patient/family understands the risks of loss of life, loss of limb and, loss of function and wishes to proceed.  A signed and witnessed consent was obtained and placed in the chart.    DESCRIPTION OF PROCEDURE:  The patient was identified in the preoperative area. A signed and witnessed consent was confirmed in the chart. The surgery team confirmed with the patient/family the operative plan and surgical site. The surgical site was confirmed by the patient/family and marked by the surgical team. The patient was given the opportunity to ask any further questions and all questions were answered.     The patient was brought to the operating room where anesthesia was induced by the anesthesia team without incident.     PATIENT POSITIONING:   The patient was placed in the lateral decubitus position on a standard table using a Keita positioner. An axillary role was placed. All extremities were padded to ensure adequate protection including floating the peroneal nerve of the down leg.     TIME OUT:  A timeout was performed prior to the procedure which verified the correct patient, positioning, operation to be performed, operative site, antibiotics, allergies, imaging, and any other concerns. All parties were in  agreement.     PROCEDURE IN DETAIL:  The operative site was cleaned, prepped, and draped in the usual sterile fashion. A final timeout was performed with all parties in agreement. A modified anterolateral approach to the hip was utilized. A 12 cm skin incision was made centered over the greater trochanter in line with the femur. This was taken down through skin and subcutaneous tissue using a 10 blade. Bleeding was controlled using electrocautery. The fascia was identified and split in line with the femur. The charnley retractor was then placed.  A split was made in the anterior 1/3 of the abductors down to femoral neck remnant and along the anterior border of vastus lateralis at the vastus ridge.  Abductor tendon was tagged and the abductors and anterior hip capsule were elevated as a sleeve along the anterolateral femoral neck and trochanter to expose the hip.     The femoral neck fracture site was exposed and a fresh osteotomy was made in order to assist with removal of the fractured femoral head and neck from the acetabulum. The femoral cut was made using the saw taking care to protect deep structures and the greater trochanter.  The broken femoral head was then removed from the acetabulum. Care was taken to preserve the labrum.  Next the femoral head was sized and the socket trialed for Bipolar femoral head size until appropriate fit and stability was achieved.     Next, the lateral remnant of neck was removed using a rongeur and the femoral canal penetrated using the canal finder.  The femur was then sequentially broached. Care was taken to avoid varus angulation of the stem and to have appropriate anteversion.  Once a stable broach was in, an  estimation of the reduction maneuver was made.  However, no formal reduction was made in order to not propagate any fracture given the patient's bone quality.      The trials were removed and the canal of the femur was irrigated thoroughly.  The femur was inspected  closely and there was no evidence of fracture or propagation of fractures.  The stem was opened to the back table and assembled.  The cement was mixed and then a retrograde fashion was inserted into the femur.  The stem was then placed in the same depth and anteversion as previously placed.  Once the cement was adequately hardened, trials of the construct with a 48 mm outer diameter head and a 28 mm inner diameter head with a +8.5 mm length was trialed and this reconstituted limb length, offset, and stability appropriately.  The hip was stable in extension and external rotation at neutral and maximum adduction, with flexion past 90 degrees and internal rotation to a physiologic endpoint at 30 degrees. It did not sublux or impinge throughout range of motion. Leg lengths were appropriately restored with appropriate soft tissue shuck tensioning and knee flexion.     The hip was dislocated, trial head was removed.  The burroughs taper was then cleaned and dried and the final head impacted into place.  The acetabulum was irrigated and confirmed to be free of debris. The hip was then reduced and taken through range of motion stability testing and was the same as above.     The hip was then copiously irrigated with normal saline. The hip was then injected with the periarticular injection.  The hip was then closed in layers. The abductors were repaired with interrupted # 5 Ethibond strata fix Vicryl. The fascia was closed with #1 Vicryl followed by a #1 Stratfix. The subcutaneous layer with 0-Vicryl in the fat, and 2-0 Vicryl in the deep dermal layer. The subcuticular layer was closed with a 3-0 monocryl. The skin was then cleaned and glued and steri-strips placed followed by a sterile dressing.     The drapes were then taken down and the patient was placed supine. Leg lengths were confirmed to be appropriate and the patient's lower extremities were warm and well perfused with brisk capillary refill and palpable pulses. The  patient was then awoken, transferred to the hospital cart and taken to the PACU in stable condition. The patient tolerated the procedure well. The patient's family was made aware of their condition.     Final sponge and needle counts were correct x2.     POSTOPERATIVE PLAN:  Pain Control: Continue per multimodal pain protocol. Minimize opioid pain medications as able.  Weight Bearing: Weight bearing as tolerated  Precautions: None  DVT Prophylaxis: Risk stratified DVT prophylaxis. Aspirin 81 mg BID x 4 weeks. Early ambulation and SCDs while in bed.  Antibiotics: 24 hours of perioperative IV antibiotics and 7 days PO antibiotics  Diet: Advance diet as tolerated from orthopedic perspective  GI: Plan for aggressive bowel regimen to prevent constipation from opioid medications  Lines: HLIV once tolerating PO  PT/OT: Eval and treatment. Will follow up on recommendations.  Follow up:  2 weeks with Dr. Israel team  Discharge plan: Pending PT/OT evaluation and ongoing workup by medical team    Melecio Israel MD  [facility]

## 2025-05-10 NOTE — MEDICATION SCRIBE - ADMISSION MEDICATION HISTORY
Medication Scribe Admission Medication History    Admission medication history is complete. The information provided in this note is only as accurate as the sources available at the time of the update.    Information Source(s): Family member via phone    Pertinent Information: Patient's daughter states that patient is taking only Levothyroxine at this time.    Changes made to PTA medication list:  Added: None  Deleted: Donepezil ( per patient's daughter)  Changed: Levothyroxine from 112 mcg to 137 mcg daily (per sure script)    Allergies reviewed with patient and updates made in EHR: yes    Medication History Completed By: Markel Holland 5/10/2025 12:18 AM    PTA Med List   Medication Sig Last Dose/Taking    levothyroxine (SYNTHROID/LEVOTHROID) 137 MCG tablet Take 137 mcg by mouth every morning (before breakfast). 5/9/2025 Morning

## 2025-05-10 NOTE — ANESTHESIA POSTPROCEDURE EVALUATION
Patient: Ophelia Spangler    Procedure: Procedure(s):  HEMIARTHROPLASTY, HIP, BIPOLAR       Anesthesia Type:  General    Note:  Disposition: Inpatient   Postop Pain Control: Uneventful            Sign Out: Well controlled pain   PONV: No   Neuro/Psych: Uneventful            Sign Out: Acceptable/Baseline neuro status   Airway/Respiratory: Uneventful            Sign Out: Acceptable/Baseline resp. status   CV/Hemodynamics: Uneventful            Sign Out: Acceptable CV status; No obvious hypovolemia; No obvious fluid overload   Other NRE:    DID A NON-ROUTINE EVENT OCCUR?            Last vitals:  Vitals Value Taken Time   /65 05/10/25 13:30   Temp 35.7  C (96.26  F) 05/10/25 13:30   Pulse 69 05/10/25 13:30   Resp 20 05/10/25 13:30   SpO2 89 % 05/10/25 13:30   Vitals shown include unfiled device data.    Electronically Signed By: Cecil Blevins MD  May 10, 2025  1:32 PM

## 2025-05-10 NOTE — CONSULTS
ORTHOPEDIC CONSULTATION    Consultation  Ophelia Spangler,  1945, MRN [id number]    Hip fracture, right, closed, initial encounter (H) [S72.001A]    PCP: Jeet Mcmahon, [phone]   Code status:  No CPR- Do NOT Intubate       Extended Emergency Contact Information  Primary Emergency Contact: Adina Spangler  Home Phone: [phone]  Relation: Sister         IMPRESSION:  In review, the patient is a 80 year old-year-old female with past medical history significant for celiac disease, hypothyroid, and dementia, now with right displaced femoral neck fracture after ground level fall on 2025.     Preoperatively, the patient was evaluated by Medicine who have recommended no further perioperative optimization based on stable chronic medical comorbidities for the proposed procedure.     Preoperatively, the nature of the procedure, risks and benefits, as well as alternatives including nonsurgical management were discussed in detail with the patient and family (Sister Adina Spangler). I reviewed and discussed the patient's condition and relevant images with the patient and family. We discussed options for further evaluation and treatment, including conservative non-operative management versus surgical intervention.       From a conservative standpoint, the patient can pursue non-operative management, which would include protected weight bearing to the lower extremity, which carries a high risk of morbidity and mortality due to prolonged and diminished mobilization/ambulation and it's associated complications, which include but are not limited to blood clots (DVT/PE), skin ulcerations and pressure sores, and pneumonia. In the long term, there is also the risk of chronic pain, fracture nonunion, fracture malunion, and avascular necrosis of the femoral head.     From a surgical standpoint, we discussed closed reduction vs open reduction and internal fixation. Given, the patient's fracture  morphology and degree of displacement, internal fixation would also carry the risks of fracture nonunion, fracture malunion, and avascular necrosis of the femoral head.  He is currently to chronic pain and potential the need for reoperation in the future.  Based on the degree of displacement and angulation, the patient/family understands that these associated factors in this treatment would place the patient at higher risk for failure requiring further surgical intervention in the future.     We also discussed what total hip arthroplasty or hip hemiarthroplasty involves, as well as the postoperative recovery and rehabilitation.  Given the above findings which include the patient s age, activity level, and medical comorbidities utilizing shared decision making we agreed to proceed with a right hip hemiarthroplasty. This procedure has a high likelihood of providing immediate post-operative mobilization.  However, no guarantees of postoperative mobilization were made.  We discussed that postoperative mobilization is often dependent on many factors including associated medical comorbidities, strength, pain, other potential injuries, and ability to participate appropriately with therapy teams. The patient/family understands the indications for and possible complications of surgery.     We discussed at length the risks and benefits of hip arthroplasty surgery. Our discussion included but was not limited to the risk of pain, bleeding, infection, blood clots (DVT, PE), wound issues, continued chronic pain in the hip, post-operative joint stiffness, painful arc of motion, difficulty with ambulation, iatrogenic fracture, damage to nearby neurovascular structures, hip instability/dislocation, allergic reaction to implanted components, implant wear, loosening and/or failure of the implants requiring revision, and possible post-operative leg length discrepancy (apparent or actual). The possibility of intra-operative and/or  post-operative medical complications such as anesthesia complications or reactions, respiratory and cardiovascular events, stroke, heart attack and/or death were also discussed. In the case of infection of the joint, the patient understands that this will require prolonged IV antibiotic therapy and possible multiple operative procedures in the future.      The patient/family demonstrated an understanding of these risks as well as the potential benefits of arthroplasty surgery which would include possible improvement in pain, range of motion, and early ambulation without the long term concerns of fracture nonunion/malunion or avascular necrosis that would be associated with non-operative management or internal fixation. The patient demonstrated an understanding of the indications of surgery and the possible complications, and together we have decided to proceed with surgery. Specific details of the surgical procedure, hospitalization, recovery, rehabilitation, and long-term precautions were also presented. The final choices will be made at the time of procedure to match the anatomy and condition of the bone, ligaments, tendons, and muscles. All of patients questions were answered preoperatively at which time informed consent was taken.     PLAN:  - Right hip hemiarthroplasty  - NPO  - NWB until OR    Thank you for including [facility] in the care of Ophelia Spangler. It has been a pleasure participating in her care.      CHIEF COMPLAINT: Right hip fracture    HISTORY OF PRESENT ILLNESS:  Ophelia Spangler is an 8-year-old female with history of celiac disease, hypothyroidism, dementia who resides in a memory care facility.  She apparently had an unwitnessed fall from a ground-level yesterday evening.  She had pain in her right hip and was unable to ambulate.  She was brought to the emergency department where she was found to have a right hip fracture.  Orthopedic surgery was consulted for further  evaluation.  She reportedly uses a walker at baseline, but does have a history of falls.    Her sister Adina Spangler is her next of kin and helps with medical decision making and also provides history today.      ALLERGIES:   Review of patient's allergies indicates   Allergies   Allergen Reactions    Sulfamethoxazole-Trimethoprim Anaphylaxis     Could be a reaction with the Cipro also.      Could be a reaction with the Cipro also.    Gluten Meal GI Disturbance     Celiac patient    Oxybutynin Swelling and Other (See Comments)     Comment: numbness around mouth, Description:    Sulfamethoxazole Other (See Comments)     Comment:  , Description:    Tolterodine Other (See Comments)     Blurred vision    Trimethoprim Other (See Comments)     Comment:  , Description:    Ciprofloxacin Itching, Other (See Comments) and Rash     Comment:  , Description:    Soap Rash         MEDICATIONS UPON ADMISSION:  Medications were reviewed.  They include:   (Not in a hospital admission)        SOCIAL HISTORY:   she  reports that she has never smoked. She has never used smokeless tobacco.      FAMILY HISTORY:  family history is not on file.      REVIEW OF SYSTEMS:   Reviewed with patient. See HPI, otherwise negative       PHYSICAL EXAMINATION:  Vitals: Temp:  [98.1  F (36.7  C)-98.5  F (36.9  C)] 98.5  F (36.9  C)  Pulse:  [67-79] 79  Resp:  [11-22] 18  BP: (130-167)/(60-80) 130/60  SpO2:  [92 %-98 %] 92 %  General: Resting in bed  Respiratory: Nonlabored breathing  Cardiovascular: Skin warm and well-perfused  Right lower extremity: No surgical scars visualized over right hip.  She is mildly tender to palpation over the right hip.  She has pain with logroll of the right lower extremity.  No tenderness along the distal thigh, knee, leg, foot or ankle.  The foot is warm and well-perfused.  There is a palpable DP pulse.  Ankle dorsiflexion and plantarflexion is intact.  Sensation is intact to light touch in the dorsal and plantar  foot.    RADIOGRAPHIC EVALUATION:  Personally reviewed.    AP pelvis, lateral views of the right hip from 5/9/2025 demonstrate a displaced right femoral neck fracture.    CT right hip from 5/9/2025:  IMPRESSION:  1.  Significant angulated/displaced comminuted fracture involving the right femoral neck.     2.  No significant hematoma identified.         PERTINENT LABS:  Lab Results: personally reviewed.   Lab Results   Component Value Date    WBC 6.8 05/10/2025    HGB 11.2 05/10/2025    HCT 35.1 05/10/2025    MCV 88 05/10/2025     05/10/2025         Melecio Israel MD  [facility]  Date: 5/10/2025  Time: 7:32 AM    CC1:   Nancy Augustin MD    CC2:   Jeet Mcmahon

## 2025-05-10 NOTE — PLAN OF CARE
Problem: Delirium  Goal: Improved Behavioral Control  Outcome: Progressing  Intervention: Minimize Safety Risk  Recent Flowsheet Documentation  Taken 5/10/2025 0000 by Alexandra Rhodes RN  Enhanced Safety Measures: room near unit station  Goal: Improved Attention and Thought Clarity  Outcome: Progressing  Goal: Improved Sleep  Outcome: Progressing     Problem: Fall Injury Risk  Goal: Absence of Fall and Fall-Related Injury  Outcome: Progressing  Intervention: Promote Injury-Free Environment  Recent Flowsheet Documentation  Taken 5/10/2025 0000 by Alexandra Rhodes RN  Safety Promotion/Fall Prevention:   safety round/check completed   room near nurse's station   activity supervised   supervised activity     Problem: Pain Acute  Goal: Optimal Pain Control and Function  Outcome: Progressing   Goal Outcome Evaluation:  Patient disoriented to place, time, situation. Able to answer some questions. Prn dilaudid given x1. IVF running at 100ml/hr. Purewick in place. VSS. NPO.

## 2025-05-10 NOTE — ANESTHESIA CARE TRANSFER NOTE
Patient: Ophelia Spangler    Procedure: Procedure(s):  HEMIARTHROPLASTY, HIP, BIPOLAR       Diagnosis: Hip fracture, right, closed, initial encounter (H) [S72.001A]  Diagnosis Additional Information: No value filed.    Anesthesia Type:   General     Note:    Oropharynx: oropharynx clear of all foreign objects  Level of Consciousness: awake  Oxygen Supplementation: room air    Independent Airway: airway patency satisfactory and stable  Dentition: dentition unchanged  Vital Signs Stable: post-procedure vital signs reviewed and stable  Report to RN Given: handoff report given  Patient transferred to: PACU    Handoff Report: Identifed the Patient, Identified the Reponsible Provider, Reviewed the pertinent medical history, Discussed the surgical course, Reviewed Intra-OP anesthesia mangement and issues during anesthesia, Set expectations for post-procedure period and Allowed opportunity for questions and acknowledgement of understanding      Vitals:  Vitals Value Taken Time   /57 05/10/25 13:13   Temp 33.3  C (91.94  F) 05/10/25 13:14   Pulse 71 05/10/25 13:14   Resp 18 05/10/25 13:14   SpO2 92 % 05/10/25 13:14   Vitals shown include unfiled device data.    Electronically Signed By: SAMUEL Banuelos CRNA  May 10, 2025  1:15 PM

## 2025-05-10 NOTE — BRIEF OP NOTE
[facility]    Brief Operative Note    Pre-operative diagnosis: Hip fracture, right, closed, initial encounter (H) [S72.001A]  Post-operative diagnosis Same as pre-operative diagnosis    Procedure: HEMIARTHROPLASTY, HIP, BIPOLAR, Right - Hip    Surgeon: Surgeons and Role:     * Melecio Israel MD - Primary  Anesthesia: General   Estimated Blood Loss: 300 mL from 5/10/2025 11:10 AM to 5/10/2025  1:09 PM      Drains: None  Specimens: * No specimens in log *  Findings:   See op report.  Complications: None.  Implants:   Implant Name Type Inv. Item Serial No.  Lot No. LRB No. Used Action   PLUG CEMENT VALERIO W/INSERT 25MM 5132-3873 - [id number] Total Joint Component/Insert PLUG CEMENT VALERIO W/INSERT 25MM [id number]  [employer]  Right 1 Implanted   IMP BONE CEMENT STRK SIMPLEX TOBRAMYCIN 40CC 6197-9-001 - [id number] Cement, Bone IMP BONE CEMENT STRK SIMPLEX TOBRAMYCIN 40 6197-9-001  [employer]  Right 3 Implanted   STEM FEMORAL SUMMIT STD CEMENTED SZ 5 1570- - [id number] Total Joint Component/Insert STEM FEMORAL SUMMIT STD CEMENTED SZ 5 1570-  [employer]-  Right 1 Implanted   IMP STEM CENTRALIZER DEPUY 10.0MM 1376- - [id number] Total Joint Component/Insert IMP STEM CENTRALIZER DEPUY 10.0MM 1376-  [employer]-  Right 1 Implanted   IMP HEAD FEMORAL DEPUY [place] 28MM +8.5MM 1365- - [id number] Total Joint Component/Insert IMP HEAD FEMORAL DEPUY [place] 28MM +8.5MM 1365-  [employer]- [id number] Right 1 Implanted   IMP HEAD FEMORAL DEPUY BIPOLAR [id number] - [id number] Total Joint Component/Insert IMP HEAD FEMORAL DEPUY BIPOLAR [id number]  [employer]-  Right 1 Implanted

## 2025-05-10 NOTE — ED PROVIDER NOTES
"EMERGENCY DEPARTMENT NOTE     Name: Ophelia Spangler    Age/Sex: 80 year old female   MRN: [id number]   Evaluation Date & Time:  5/9/2025  7:12 PM    PCP:    Jeet Mcmahon   ED Provider: Allen Landaverde D.O.       CHIEF COMPLAINT    Fall and Hip Pain     HISTORY OF PRESENT ILLNESS BRIEF   Ophelia Spangler is a 80 year old year old female with a relevant past history of dementia, osteoarthritis of left shoulder, and hypothyroidism who presents to the ED  for evaluation of fall.      DIAGNOSIS & DISPOSITION/MEDICAL DECISION MAKING     1. Hip fracture, right, closed, initial encounter (H)        EMERGENCY DEPARTMENT COURSE   7:34 PM I met with the patient to gather history and to perform my initial exam.  We discussed treatment options and the plan for care while in the Emergency Department.  10:25 PM I spoke with the patients sister to discuss the treatment plan. Sister is POA, she'll be here by 8am tomorrow morning.  10:30 PM Case was discussed with the hospitalist service Dr. Perez  11:27 PM Discussed case with Dr. Yan for [facility]    Triage vital signs: BP (!) 145/63   Pulse 72   Temp 98.1  F (36.7  C) (Oral)   Resp 11   Ht 1.702 m (5' 7\")   Wt 90.7 kg (200 lb)   SpO2 97%   BMI 31.32 kg/m     Differential diagnosis considered included but not limited to: Traumatic process including subdural ICH, cervical spine fracture, hip or pelvis fracture, knee fracture, rhabdomyolysis    MDM:  ED Course as of 05/09/25 2334   Fri May 09, 2025   2222 CT hip  reviewed by myself which demonstrated a subcapital fracture of the right hip.  Formal radiology read pending   Right knee x-ray was reviewed by myself: No fracture  Right hip Xray:Probable subcapital hip fracture  CT of the head was reviewed by myself: No traumatic process.  CT cervical spine: No fracture  Laboratory studies were obtained interpreted by myself:  CBC and comprehensive metabolic profile within normal limits except for " "WBC 15,000.  Total CK 87.  Patient received IV morphine and ketorolac for pain management and is resting comfortably except when moved.  Discussed case with the hospitalist service and Dr. Yan for [facility] and patient will be admitted, kept n.p.o. after midnight for expected ORIF of right hip fracture.  Patient has underlying dementia and I discussed the current findings and plan with her sister who is her power of  and she is in agreement.  Discharge Vital Signs:BP (!) 145/63   Pulse 72   Temp 98.1  F (36.7  C) (Oral)   Resp 11   Ht 1.702 m (5' 7\")   Wt 90.7 kg (200 lb)   SpO2 97%   BMI 31.32 kg/m     PROCEDURES:   None  Diagnostic studies:  CT Hip Right w/o Contrast   Final Result   IMPRESSION:   1.  Significant angulated/displaced comminuted fracture involving the right femoral neck.      2.  No significant hematoma identified.         XR Pelvis and Hip Right 2 Views   Final Result   IMPRESSION: There is probably a right femoral neck fracture, but the views are unconventional and the hip is internally rotated, so assessment is limited. Repeat right hip radiographs or further evaluation with CT suggested. Degenerative changes in the    spine. Mild osteoarthrosis of the left SI joint.      XR Knee Right 1/2 Views   Final Result   IMPRESSION: Moderate to severe tricompartmental osteoarthrosis. Diffuse bone demineralization. No fracture, effusion or calcified intra-articular body of the knee.      CT Cervical Spine w/o Contrast   Final Result   IMPRESSION:   HEAD CT:   1.  No CT evidence for acute intracranial process.   2.  Brain atrophy and presumed chronic microvascular ischemic changes as above.      CERVICAL SPINE CT:   1.  No CT evidence for acute fracture or post traumatic subluxation.   2.  Cervical spondylosis as above.         CT Head w/o Contrast   Final Result   IMPRESSION:   HEAD CT:   1.  No CT evidence for acute intracranial process.   2.  Brain atrophy and presumed chronic " microvascular ischemic changes as above.      CERVICAL SPINE CT:   1.  No CT evidence for acute fracture or post traumatic subluxation.   2.  Cervical spondylosis as above.           Labs Ordered and Resulted from Time of ED Arrival to Time of ED Departure   BASIC METABOLIC PANEL - Abnormal       Result Value    Sodium 140      Potassium 3.7      Chloride 106      Carbon Dioxide (CO2) 28      Anion Gap 6 (*)     Urea Nitrogen 19.9      Creatinine 0.62      GFR Estimate 90      Calcium 9.5      Glucose 117 (*)    CBC WITH PLATELETS AND DIFFERENTIAL - Abnormal    WBC Count 15.0 (*)     RBC Count 4.44      Hemoglobin 12.7      Hematocrit 39.0      MCV 88      MCH 28.6      MCHC 32.6      RDW 13.2      Platelet Count 209      % Neutrophils 88      % Lymphocytes 6      % Monocytes 5      % Eosinophils 0      % Basophils 0      % Immature Granulocytes 1      NRBCs per 100 WBC 0      Absolute Neutrophils 13.3 (*)     Absolute Lymphocytes 0.9      Absolute Monocytes 0.8      Absolute Eosinophils 0.0      Absolute Basophils 0.1      Absolute Immature Granulocytes 0.1      Absolute NRBCs 0.0     HEPATIC FUNCTION PANEL - Normal    Protein Total 7.1      Albumin 3.9      Bilirubin Total 0.5      Alkaline Phosphatase 104      AST 21      ALT 18      Bilirubin Direct 0.17     CK TOTAL - Normal    CK 87     ROUTINE UA WITH MICROSCOPIC REFLEX TO CULTURE     ED INTERVENTIONS     Medications   morphine (PF) injection 4 mg (4 mg Intravenous $Given 5/9/25 2009)   ketorolac (TORADOL) injection 15 mg (15 mg Intravenous $Given 5/9/25 2144)   morphine (PF) injection 4 mg (4 mg Intravenous $Given 5/9/25 2142)     TOTAL CRITICAL CARE TIME (EXCLUDING PROCEDURES): Not applicable      DISCHARGE MEDICATIONS        Review of your medicines        UNREVIEWED medicines. Ask your doctor about these medicines        Dose / Directions   * donepezil 5 MG tablet  Commonly known as: ARICEPT      Dose: 1 tablet  Take 1 tablet by mouth daily  Refills: 0      * donepezil 5 MG tablet  Commonly known as: ARICEPT  Used for: Mild dementia, unspecified dementia type, unspecified whether behavioral, psychotic, or mood disturbance or anxiety (H)      Dose: 5 mg  Take 1 tablet (5 mg) by mouth at bedtime.  Quantity: 30 tablet  Refills: 1     levothyroxine 112 MCG tablet  Commonly known as: SYNTHROID/LEVOTHROID  Used for: Hypothyroidism, unspecified type      Dose: 112 mcg  Take 1 tablet (112 mcg) by mouth daily.  Quantity: 90 tablet  Refills: 1           * This list has 2 medication(s) that are the same as other medications prescribed for you. Read the directions carefully, and ask your doctor or other care provider to review them with you.                DISPOSITION: [facility]      At the conclusion of the encounter I discussed the results of all of the tests and the disposition. The questions were answered. The patient or family acknowledged understanding and was agreeable with the care plan.        HISTORY OF PRESENT ILLNESS   Ophelia Spangler is a 80 year old year old female with a relevant past history of dementia, osteoarthritis of left shoulder, and hypothyroidism who presents to the ED  for evaluation of fall.    HPI LIMITED DUE TO DEMENTIA    Per nursing staff, the patient experienced an unwitnessed fall at her memory care center today. Patient has dementia and has no memory of the fall, she is unable to provide any history in the ED regarding the incident. Patient expressed pain of her left knee, hip, thigh, shoulder, and elbow. She also expressed discomfort with right hip and knee palpation. Patient denies chest pain, shortness of breath, and abdominal pain. There were no other concerns/complaints at this time.       INFORMATION SOURCE AND LIMITATIONS    History/Exam limitations: Patient has dementia  Patient information was obtained from: the patient and EMS report to nursing staff  Use of : N/A        REVIEW OF SYSTEMS:   All other  "systems reviewed and are negative except as noted above in HPI.    PATIENT HISTORY   No past medical history on file.  Patient Active Problem List   Diagnosis    Morbid (severe) obesity due to excess calories (H)    Hip fracture, right, closed, initial encounter (H)     No past surgical history on file.    Allergies   Allergen Reactions    Sulfamethoxazole-Trimethoprim Anaphylaxis     Could be a reaction with the Cipro also.      Could be a reaction with the Cipro also.    Oxybutynin Swelling and Other (See Comments)     Comment: numbness around mouth, Description:    Sulfamethoxazole Other (See Comments)     Comment:  , Description:    Tolterodine Other (See Comments)     Blurred vision    Trimethoprim Other (See Comments)     Comment:  , Description:    Ciprofloxacin Itching, Other (See Comments) and Rash     Comment:  , Description:    Soap Rash       OUTPATIENT MEDICATIONS     New Prescriptions    No medications on file      Vitals:    05/09/25 1837 05/09/25 2044 05/09/25 2045 05/09/25 2145   BP: (!) 167/80  (!) 140/64 (!) 145/63   Pulse: 71 74 72 72   Resp: 16 20 22 11   Temp: 98.1  F (36.7  C)      TempSrc: Oral      SpO2: 98% 97% 97% 97%   Weight: 90.7 kg (200 lb)      Height: 1.702 m (5' 7\")          Physical Exam   Constitutional: Oriented to person. Appears well-developed and well-nourished.   HEENT:    Head: Atraumatic.   Neck: Spine nontender  Cardiovascular: Normal rate, regular rhythm and normal heart sounds.    Pulmonary/Chest: Normal effort  and breath sounds normal.   Abdominal: Soft. Bowel sounds are normal.   Musculoskeletal: Tenderness of the right hip which appears to be internally rotated and somewhat shortened compared to the left.  Is also tenderness at the right knee.  Neurological: Alert and oriented to person Normal strength. No sensory deficit.   Skin: Skin is warm and dry.   Psychiatric: Normal mood and affect. Behavior is normal. Thought content normal.       DIAGNOSTICS    LABORATORY " FINDINGS (REVIEWED AND INTERPRETED):  Labs Ordered and Resulted from Time of ED Arrival to Time of ED Departure   BASIC METABOLIC PANEL - Abnormal       Result Value    Sodium 140      Potassium 3.7      Chloride 106      Carbon Dioxide (CO2) 28      Anion Gap 6 (*)     Urea Nitrogen 19.9      Creatinine 0.62      GFR Estimate 90      Calcium 9.5      Glucose 117 (*)    CBC WITH PLATELETS AND DIFFERENTIAL - Abnormal    WBC Count 15.0 (*)     RBC Count 4.44      Hemoglobin 12.7      Hematocrit 39.0      MCV 88      MCH 28.6      MCHC 32.6      RDW 13.2      Platelet Count 209      % Neutrophils 88      % Lymphocytes 6      % Monocytes 5      % Eosinophils 0      % Basophils 0      % Immature Granulocytes 1      NRBCs per 100 WBC 0      Absolute Neutrophils 13.3 (*)     Absolute Lymphocytes 0.9      Absolute Monocytes 0.8      Absolute Eosinophils 0.0      Absolute Basophils 0.1      Absolute Immature Granulocytes 0.1      Absolute NRBCs 0.0     HEPATIC FUNCTION PANEL - Normal    Protein Total 7.1      Albumin 3.9      Bilirubin Total 0.5      Alkaline Phosphatase 104      AST 21      ALT 18      Bilirubin Direct 0.17     CK TOTAL - Normal    CK 87     ROUTINE UA WITH MICROSCOPIC REFLEX TO CULTURE         IMAGING (REVIEWED AND INTERPRETED):  CT Hip Right w/o Contrast   Final Result   IMPRESSION:   1.  Significant angulated/displaced comminuted fracture involving the right femoral neck.      2.  No significant hematoma identified.         XR Pelvis and Hip Right 2 Views   Final Result   IMPRESSION: There is probably a right femoral neck fracture, but the views are unconventional and the hip is internally rotated, so assessment is limited. Repeat right hip radiographs or further evaluation with CT suggested. Degenerative changes in the    spine. Mild osteoarthrosis of the left SI joint.      XR Knee Right 1/2 Views   Final Result   IMPRESSION: Moderate to severe tricompartmental osteoarthrosis. Diffuse bone  demineralization. No fracture, effusion or calcified intra-articular body of the knee.      CT Cervical Spine w/o Contrast   Final Result   IMPRESSION:   HEAD CT:   1.  No CT evidence for acute intracranial process.   2.  Brain atrophy and presumed chronic microvascular ischemic changes as above.      CERVICAL SPINE CT:   1.  No CT evidence for acute fracture or post traumatic subluxation.   2.  Cervical spondylosis as above.         CT Head w/o Contrast   Final Result   IMPRESSION:   HEAD CT:   1.  No CT evidence for acute intracranial process.   2.  Brain atrophy and presumed chronic microvascular ischemic changes as above.      CERVICAL SPINE CT:   1.  No CT evidence for acute fracture or post traumatic subluxation.   2.  Cervical spondylosis as above.               ECG (REVIEWED AND INTERPRETED):   ECG:   Performed at: 09-MAY-2025 2054  HR:  74 bpm  Rhythm: Sinus   Axis: 2  QRS duration: 86 ms  QTC: 430 ms  ST changes: No ST segment elevation or depression, no T wave inversion, No Q wave  Interpretation: Sinus rhythm with sinus arrhythmia  Compared to most recent ECG from: No prior for comparison    I have reviewed the patient's ECG, with comments made as listed above. Please see scanned image for full interpretation.     Billing Documentation    I obtained additional history from these independent historians:EMS    I reviewed these outside records:  1/20/2025 - [facility] ED - Acute cystitis without hematuria      I noted these abnormal vital signs / labs:  ONEIDA    Monitor Strip Interpretation:  Sinus  12-Lead ECG Interpretation:  NA  I independently reviewed the following diagnostic studies:  CT head, CT of the right hip, right knee x-ray, right hip x-ray  I spoke to the following clinicians regard the patients care:  Hospitalist, orthopedic surgery  My disposition decision is based on the following reasons:  Admit:    Compliance Documentation  MIPS Documentation Medical Decision Making  I reviewed the EMR:  Outpatient Record: See Above  Care impacted by Dementia  I independently interpreted the CT of the head, right knee x-ray, right hip x-ray and note no traumatic process on CT of the head, no fracture of the right knee, fracture of the right hip. See radiology report for final interpretation.  I discussed the care with another health care provider: Hospitalist, orthopedic surgery  Admit.    MIPS (CTPE, Dental pain, [place], Sinusitis, Asthma/COPD, Head Trauma): Adult Minor Head Trauma:Age 65 years or older    SEPSIS: None        At the time of my evaluation, I do not feel the patient s symptoms are caused by sepsis      I, Mathew Sanford, am serving as a scribe to document services personally performed by Dr. Landaverde, based on my observations and the provider's statements to me.  I, Dr. Landaverde, attest that Mathew Sanford is acting in a scribe capacity, has observed my performance of the services and has documented them in accordance with my direction.      Allen Landaverde D.O.  EMERGENCY MEDICINE   05/09/25  [facility] EMERGENCY DEPARTMENT  [address] [place] [id number]  [phone]  Dept: [phone]     Allen Landaverde DO  05/09/25 2604

## 2025-05-10 NOTE — PROGRESS NOTES
"[facility]    Medicine Progress Note - Hospitalist Service    Date of Admission:  5/9/2025    Assessment & Plan       81 y/o with dementia, Celiac disease, hypothyroid who lives in memory care had fall and sustained acute right fem neck fracture    1.Fall  -per sister, she has hx of multiple falls. Is to use walker and at times gets up at memory care without assistance  -She notes this fall happened in bathroom she was on her own and did not call for help  -fall precautions here    2.Acute right hip fx  -npo  -pain control  -ortho to take to OR    3.Leukocytosis  -likely reactive to fx  -this am already down to 6.8    4.Covid exposure--per sister, memory care unit has 3 active cases now, she notes Ophelia was tested Wed and negative  -will test her now  -no cough or symptoms per sister  -she is vaccinated    5.hx of Celiac disease  -when allowed to eat needs gluten free diet    6.Hypothyroid  -continue med    7.Dementia  -in memory care   -not on meds   -uses walker, but forgets and has hx of falls    8.Pre-op eval  -would not delay urgent hip surgery    9.DVT prevent- scd    10.Diet-npo for OR    -I spoke to sister this am at 0715--I confirmed code status DNR          Diet: NPO for Medical/Clinical Reasons Except for: Meds, Ice Chips    DVT Prophylaxis: Pneumatic Compression Devices  Valiente Catheter: Not present  Lines: None     Cardiac Monitoring: None  Code Status: No CPR- Do NOT Intubate      Clinically Significant Risk Factors Present on Admission                             # Obesity: Estimated body mass index is 31.32 kg/m  as calculated from the following:    Height as of this encounter: 1.702 m (5' 7\").    Weight as of this encounter: 90.7 kg (200 lb).              Social Drivers of Health    Depression: Not at risk (1/10/2024)    PHQ-2     PHQ-2 Score: 0   Recent Concern: Depression - At risk (11/27/2023)    PHQ-2     PHQ-2 Score: 6    Received from [facility] [facility]    Social Connections          Disposition Plan     Medically Ready for Discharge: Anticipated in 2-4 Days             Nancy Augustin MD  Hospitalist Service  [facility]  Securely message with [employer] (more info)  Text page via [employer] Paging/Directory   ______________________________________________________________________    Interval History   She is sleepy, wakes  Pain in hip    Physical Exam   Vital Signs: Temp: 98.5  F (36.9  C) Temp src: Oral BP: 130/60 Pulse: 79   Resp: 18 SpO2: 92 % O2 Device: None (Room air)    Weight: 200 lbs 0 oz    Constitutional: fatigued, sleepy, cooperative, and no apparent distress  Respiratory: no increased work of breathing, good air exchange, no retractions, and clear to auscultation  Cardiovascular: regular rate and rhythm and normal S1 and S2  GI: normal bowel sounds, soft, non-distended, and non-tender  Skin: no bruising or bleeding  Musculoskeletal: right leg shortened and external rotation  Neurologic: Mental Status Exam:  Level of Alertness:   lethargic  Neuropsychiatric: General: calm and poor eye contact    Medical Decision Making       55 MINUTES SPENT BY ME on the date of service doing chart review, history, exam, documentation & further activities per the note.      Data     I have personally reviewed the following data over the past 24 hrs:    6.8  \   11.2 (L)   / 173     141 109 (H) 21.5 /  100 (H)   3.9 24 0.62 \     ALT: 18 AST: 21 AP: 104 TBILI: 0.5   ALB: 3.9 TOT PROTEIN: 7.1 LIPASE: N/A     INR:  1.16 (H) PTT:  N/A   D-dimer:  N/A Fibrinogen:  N/A       Imaging results reviewed over the past 24 hrs:   Recent Results (from the past 24 hours)   CT Head w/o Contrast    Narrative    EXAM: CT HEAD W/O CONTRAST, CT CERVICAL SPINE W/O CONTRAST  LOCATION: [facility]  DATE: 5/9/2025    INDICATION: trauma  COMPARISON: April 25, 2016  TECHNIQUE:   1) Routine CT Head without IV contrast. Multiplanar reformats.  Dose reduction techniques were used.  2) Routine CT Cervical Spine without IV contrast. Multiplanar reformats. Dose reduction techniques were used.    FINDINGS:   HEAD CT:   INTRACRANIAL CONTENTS: No intracranial hemorrhage, extraaxial collection, or mass effect.  No CT evidence of acute infarct. Mild to moderate presumed chronic small vessel ischemic changes. Moderate generalized volume loss. No hydrocephalus.     VISUALIZED ORBITS/SINUSES/MASTOIDS: Prior bilateral cataract surgery. Visualized portions of the orbits are otherwise unremarkable. No paranasal sinus mucosal disease. No middle ear or mastoid effusion.    BONES/SOFT TISSUES: No acute abnormality.    CERVICAL SPINE CT:   VERTEBRA: Normal vertebral body heights and alignment. No fracture or posttraumatic subluxation.     CANAL/FORAMINA: No severe spinal canal stenosis. Moderate spinal canal stenosis C4-C5. Severe osseous foraminal stenosis left at C6-C7. Multiple moderate foraminal stenoses elsewhere.    PARASPINAL: No extraspinal abnormality. Visualized lung fields are clear.      Impression    IMPRESSION:  HEAD CT:  1.  No CT evidence for acute intracranial process.  2.  Brain atrophy and presumed chronic microvascular ischemic changes as above.    CERVICAL SPINE CT:  1.  No CT evidence for acute fracture or post traumatic subluxation.  2.  Cervical spondylosis as above.     CT Cervical Spine w/o Contrast    Narrative    EXAM: CT HEAD W/O CONTRAST, CT CERVICAL SPINE W/O CONTRAST  LOCATION: [facility]  DATE: 5/9/2025    INDICATION: trauma  COMPARISON: April 25, 2016  TECHNIQUE:   1) Routine CT Head without IV contrast. Multiplanar reformats. Dose reduction techniques were used.  2) Routine CT Cervical Spine without IV contrast. Multiplanar reformats. Dose reduction techniques were used.    FINDINGS:   HEAD CT:   INTRACRANIAL CONTENTS: No intracranial hemorrhage, extraaxial collection, or mass effect.  No CT evidence of acute  infarct. Mild to moderate presumed chronic small vessel ischemic changes. Moderate generalized volume loss. No hydrocephalus.     VISUALIZED ORBITS/SINUSES/MASTOIDS: Prior bilateral cataract surgery. Visualized portions of the orbits are otherwise unremarkable. No paranasal sinus mucosal disease. No middle ear or mastoid effusion.    BONES/SOFT TISSUES: No acute abnormality.    CERVICAL SPINE CT:   VERTEBRA: Normal vertebral body heights and alignment. No fracture or posttraumatic subluxation.     CANAL/FORAMINA: No severe spinal canal stenosis. Moderate spinal canal stenosis C4-C5. Severe osseous foraminal stenosis left at C6-C7. Multiple moderate foraminal stenoses elsewhere.    PARASPINAL: No extraspinal abnormality. Visualized lung fields are clear.      Impression    IMPRESSION:  HEAD CT:  1.  No CT evidence for acute intracranial process.  2.  Brain atrophy and presumed chronic microvascular ischemic changes as above.    CERVICAL SPINE CT:  1.  No CT evidence for acute fracture or post traumatic subluxation.  2.  Cervical spondylosis as above.     XR Pelvis and Hip Right 2 Views    Narrative    EXAM: XR PELVIS AND HIP RIGHT 2 VIEWS  LOCATION: [facility]  DATE: 5/9/2025    INDICATION: Trauma.  COMPARISON: None.      Impression    IMPRESSION: There is probably a right femoral neck fracture, but the views are unconventional and the hip is internally rotated, so assessment is limited. Repeat right hip radiographs or further evaluation with CT suggested. Degenerative changes in the   spine. Mild osteoarthrosis of the left SI joint.   XR Knee Right 1/2 Views    Narrative    EXAM: XR KNEE RIGHT 1/2 VIEWS  LOCATION: [facility]  DATE: 5/9/2025    INDICATION: Trauma  COMPARISON: None.      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 Diffuse bone demineralization. No fracture, effusion or calcified intra-articular body of the knee.   CT  Hip Right w/o Contrast    Narrative    EXAM: CT HIP RIGHT W/O CONTRAST  LOCATION: [facility]  DATE: 5/9/2025    INDICATION: fall hip pain non diagnostic xray  COMPARISON: X-ray 5/9/2025  TECHNIQUE: Noncontrast. Axial, sagittal and coronal thin-section reconstruction. Dose reduction techniques were used.     FINDINGS:     BONES:  -There is a significantly angulated/displaced comminuted fracture involving the right femoral neck. No extension into the trochanters femoral head.    SOFT TISSUES:  -No significant hematoma identified.      Impression    IMPRESSION:  1.  Significant angulated/displaced comminuted fracture involving the right femoral neck.    2.  No significant hematoma identified.

## 2025-05-10 NOTE — ANESTHESIA PREPROCEDURE EVALUATION
Anesthesia Pre-Procedure Evaluation    Patient: Ophelia Spangler   MRN: [id number] : 1945          Procedure : Procedure(s):  HEMIARTHROPLASTY, HIP, BIPOLAR         No past medical history on file.   History reviewed. No pertinent surgical history.   Allergies   Allergen Reactions    Sulfamethoxazole-Trimethoprim Anaphylaxis     Could be a reaction with the Cipro also.      Could be a reaction with the Cipro also.    Gluten Meal GI Disturbance     Celiac patient    Oxybutynin Swelling and Other (See Comments)     Comment: numbness around mouth, Description:    Sulfamethoxazole Other (See Comments)     Comment:  , Description:    Tolterodine Other (See Comments)     Blurred vision    Trimethoprim Other (See Comments)     Comment:  , Description:    Ciprofloxacin Itching, Other (See Comments) and Rash     Comment:  , Description:    Soap Rash      Social History     Tobacco Use    Smoking status: Never    Smokeless tobacco: Never   Substance Use Topics    Alcohol use: Not on file      Wt Readings from Last 1 Encounters:   25 90.7 kg (200 lb)        Anesthesia Evaluation   Pt has had prior anesthetic.         ROS/MED HX  ENT/Pulmonary:       Neurologic:       Cardiovascular:       METS/Exercise Tolerance:     Hematologic:       Musculoskeletal:       GI/Hepatic:       Renal/Genitourinary:       Endo:       Psychiatric/Substance Use:       Infectious Disease:       Malignancy:       Other:              Physical Exam  Airway  Mallampati: II  TM distance: >3 FB  Neck ROM: full  Mouth opening: < 4 cm    Cardiovascular    Dental     Pulmonary       Neurological   Other Findings       OUTSIDE LABS:  CBC:   Lab Results   Component Value Date    WBC 8.2 05/10/2025    WBC 6.8 05/10/2025    HGB 12.1 05/10/2025    HGB 11.2 (L) 05/10/2025    HCT 37.5 05/10/2025    HCT 35.1 05/10/2025     05/10/2025     05/10/2025     BMP:   Lab Results   Component Value Date     05/10/2025      "05/09/2025    POTASSIUM 3.9 05/10/2025    POTASSIUM 3.7 05/09/2025    CHLORIDE 109 (H) 05/10/2025    CHLORIDE 106 05/09/2025    CO2 24 05/10/2025    CO2 28 05/09/2025    BUN 21.5 05/10/2025    BUN 19.9 05/09/2025    CR 0.62 05/10/2025    CR 0.62 05/09/2025     (H) 05/10/2025     (H) 05/09/2025     COAGS:   Lab Results   Component Value Date    INR 1.12 05/10/2025     POC: No results found for: \"BGM\", \"HCG\", \"HCGS\"  HEPATIC:   Lab Results   Component Value Date    ALBUMIN 3.9 05/09/2025    PROTTOTAL 7.1 05/09/2025    ALT 18 05/09/2025    AST 21 05/09/2025    ALKPHOS 104 05/09/2025    BILITOTAL 0.5 05/09/2025     OTHER:   Lab Results   Component Value Date    A1C 5.4 01/10/2024    SAURABH 9.3 05/10/2025    PHOS 3.6 05/10/2025    MAG 1.9 05/10/2025    TSH 0.42 05/06/2025    T4 0.13 (L) 11/27/2024       Anesthesia Plan    ASA Status:  3       Anesthesia Type: General.   Induction: intravenous.        Consents            Postoperative Care            Comments:                   Cecil Blevins MD    I have reviewed the pertinent notes and labs in the chart from the past 30 days and (re)examined the patient.  Any updates or changes from those notes are reflected in this note.    Clinically Significant Risk Factors Present on Admission          # Hyperchloremia: Highest Cl = 109 mmol/L in last 2 days, will monitor as appropriate                        # Obesity: Estimated body mass index is 31.32 kg/m  as calculated from the following:    Height as of this encounter: 1.702 m (5' 7\").    Weight as of this encounter: 90.7 kg (200 lb).                    "

## 2025-05-11 ENCOUNTER — APPOINTMENT (OUTPATIENT)
Dept: OCCUPATIONAL THERAPY | Facility: HOSPITAL | Age: 80
End: 2025-05-11
Attending: STUDENT IN AN ORGANIZED HEALTH CARE EDUCATION/TRAINING PROGRAM
Payer: COMMERCIAL

## 2025-05-11 ENCOUNTER — APPOINTMENT (OUTPATIENT)
Dept: PHYSICAL THERAPY | Facility: HOSPITAL | Age: 80
End: 2025-05-11
Payer: COMMERCIAL

## 2025-05-11 LAB
ALBUMIN UR-MCNC: 100 MG/DL
ANION GAP SERPL CALCULATED.3IONS-SCNC: 3 MMOL/L (ref 7–15)
APPEARANCE UR: ABNORMAL
BACTERIA #/AREA URNS HPF: ABNORMAL /HPF
BILIRUB UR QL STRIP: NEGATIVE
BUN SERPL-MCNC: 21.8 MG/DL (ref 8–23)
CALCIUM SERPL-MCNC: 8.5 MG/DL (ref 8.8–10.4)
CHLORIDE SERPL-SCNC: 111 MMOL/L (ref 98–107)
COLOR UR AUTO: YELLOW
CREAT SERPL-MCNC: 0.7 MG/DL (ref 0.51–0.95)
EGFRCR SERPLBLD CKD-EPI 2021: 87 ML/MIN/1.73M2
ERYTHROCYTE [DISTWIDTH] IN BLOOD BY AUTOMATED COUNT: 13.3 % (ref 10–15)
GLUCOSE BLDC GLUCOMTR-MCNC: 87 MG/DL (ref 70–99)
GLUCOSE SERPL-MCNC: 98 MG/DL (ref 70–99)
GLUCOSE UR STRIP-MCNC: 70 MG/DL
HCO3 SERPL-SCNC: 28 MMOL/L (ref 22–29)
HCT VFR BLD AUTO: 31.4 % (ref 35–47)
HGB BLD-MCNC: 9.7 G/DL (ref 11.7–15.7)
HGB UR QL STRIP: ABNORMAL
KETONES UR STRIP-MCNC: NEGATIVE MG/DL
LEUKOCYTE ESTERASE UR QL STRIP: ABNORMAL
MAGNESIUM SERPL-MCNC: 1.9 MG/DL (ref 1.7–2.3)
MCH RBC QN AUTO: 28 PG (ref 26.5–33)
MCHC RBC AUTO-ENTMCNC: 30.9 G/DL (ref 31.5–36.5)
MCV RBC AUTO: 91 FL (ref 78–100)
MUCOUS THREADS #/AREA URNS LPF: PRESENT /LPF
NITRATE UR QL: NEGATIVE
PH UR STRIP: 6 [PH] (ref 5–7)
PLATELET # BLD AUTO: 143 10E3/UL (ref 150–450)
POTASSIUM SERPL-SCNC: 3.8 MMOL/L (ref 3.4–5.3)
RBC # BLD AUTO: 3.47 10E6/UL (ref 3.8–5.2)
RBC URINE: 25 /HPF
SODIUM SERPL-SCNC: 142 MMOL/L (ref 135–145)
SP GR UR STRIP: 1.03 (ref 1–1.03)
SQUAMOUS EPITHELIAL: 124 /HPF
TRANSITIONAL EPI: 4 /HPF
UROBILINOGEN UR STRIP-MCNC: NORMAL MG/DL
WBC # BLD AUTO: 8.3 10E3/UL (ref 4–11)
WBC URINE: >182 /HPF

## 2025-05-11 PROCEDURE — 250N000013 HC RX MED GY IP 250 OP 250 PS 637: Performed by: INTERNAL MEDICINE

## 2025-05-11 PROCEDURE — 97165 OT EVAL LOW COMPLEX 30 MIN: CPT | Mod: GO

## 2025-05-11 PROCEDURE — 250N000011 HC RX IP 250 OP 636: Performed by: INTERNAL MEDICINE

## 2025-05-11 PROCEDURE — 36415 COLL VENOUS BLD VENIPUNCTURE: CPT | Performed by: INTERNAL MEDICINE

## 2025-05-11 PROCEDURE — 80048 BASIC METABOLIC PNL TOTAL CA: CPT | Performed by: INTERNAL MEDICINE

## 2025-05-11 PROCEDURE — 120N000001 HC R&B MED SURG/OB

## 2025-05-11 PROCEDURE — 83735 ASSAY OF MAGNESIUM: CPT | Performed by: INTERNAL MEDICINE

## 2025-05-11 PROCEDURE — 85027 COMPLETE CBC AUTOMATED: CPT | Performed by: INTERNAL MEDICINE

## 2025-05-11 PROCEDURE — 97162 PT EVAL MOD COMPLEX 30 MIN: CPT | Mod: GP | Performed by: PHYSICAL THERAPIST

## 2025-05-11 PROCEDURE — 99233 SBSQ HOSP IP/OBS HIGH 50: CPT | Performed by: INTERNAL MEDICINE

## 2025-05-11 PROCEDURE — 81001 URINALYSIS AUTO W/SCOPE: CPT | Performed by: INTERNAL MEDICINE

## 2025-05-11 PROCEDURE — 250N000011 HC RX IP 250 OP 636: Performed by: STUDENT IN AN ORGANIZED HEALTH CARE EDUCATION/TRAINING PROGRAM

## 2025-05-11 PROCEDURE — 87086 URINE CULTURE/COLONY COUNT: CPT | Performed by: INTERNAL MEDICINE

## 2025-05-11 PROCEDURE — 999N000157 HC STATISTIC RCP TIME EA 10 MIN

## 2025-05-11 PROCEDURE — 250N000013 HC RX MED GY IP 250 OP 250 PS 637: Performed by: STUDENT IN AN ORGANIZED HEALTH CARE EDUCATION/TRAINING PROGRAM

## 2025-05-11 RX ORDER — CEFTRIAXONE 1 G/1
1 INJECTION, POWDER, FOR SOLUTION INTRAMUSCULAR; INTRAVENOUS EVERY 24 HOURS
Status: COMPLETED | OUTPATIENT
Start: 2025-05-11 | End: 2025-05-13

## 2025-05-11 RX ADMIN — CEFTRIAXONE SODIUM 1 G: 1 INJECTION, POWDER, FOR SOLUTION INTRAMUSCULAR; INTRAVENOUS at 14:17

## 2025-05-11 RX ADMIN — CEFAZOLIN SODIUM 2 G: 2 SOLUTION INTRAVENOUS at 03:13

## 2025-05-11 RX ADMIN — KETOROLAC TROMETHAMINE 15 MG: 15 INJECTION, SOLUTION INTRAMUSCULAR; INTRAVENOUS at 05:55

## 2025-05-11 RX ADMIN — POLYETHYLENE GLYCOL 3350 17 G: 17 POWDER, FOR SOLUTION ORAL at 09:43

## 2025-05-11 RX ADMIN — LEVOTHYROXINE SODIUM 137 MCG: 0.14 TABLET ORAL at 06:00

## 2025-05-11 RX ADMIN — OXYCODONE HYDROCHLORIDE 2.5 MG: 5 TABLET ORAL at 21:44

## 2025-05-11 RX ADMIN — SENNOSIDES AND DOCUSATE SODIUM 1 TABLET: 50; 8.6 TABLET ORAL at 21:44

## 2025-05-11 RX ADMIN — SENNOSIDES AND DOCUSATE SODIUM 1 TABLET: 50; 8.6 TABLET ORAL at 09:43

## 2025-05-11 RX ADMIN — ASPIRIN 81 MG: 81 TABLET, COATED ORAL at 09:43

## 2025-05-11 RX ADMIN — CEPHALEXIN 500 MG: 500 CAPSULE ORAL at 12:49

## 2025-05-11 RX ADMIN — KETOROLAC TROMETHAMINE 15 MG: 15 INJECTION, SOLUTION INTRAMUSCULAR; INTRAVENOUS at 12:49

## 2025-05-11 RX ADMIN — ACETAMINOPHEN 975 MG: 325 TABLET ORAL at 17:21

## 2025-05-11 RX ADMIN — ACETAMINOPHEN 975 MG: 325 TABLET ORAL at 09:43

## 2025-05-11 RX ADMIN — ASPIRIN 81 MG: 81 TABLET, COATED ORAL at 21:44

## 2025-05-11 ASSESSMENT — ACTIVITIES OF DAILY LIVING (ADL)
ADLS_ACUITY_SCORE: 66
ADLS_ACUITY_SCORE: 64
ADLS_ACUITY_SCORE: 60
ADLS_ACUITY_SCORE: 66
ADLS_ACUITY_SCORE: 64
ADLS_ACUITY_SCORE: 66
ADLS_ACUITY_SCORE: 64
ADLS_ACUITY_SCORE: 66
ADLS_ACUITY_SCORE: 60
DEPENDENT_IADLS:: CLEANING;COOKING;LAUNDRY;SHOPPING;MEAL PREPARATION;MEDICATION MANAGEMENT;MONEY MANAGEMENT;TRANSPORTATION;INCONTINENCE
ADLS_ACUITY_SCORE: 64
ADLS_ACUITY_SCORE: 66
ADLS_ACUITY_SCORE: 60
ADLS_ACUITY_SCORE: 66
ADLS_ACUITY_SCORE: 64
ADLS_ACUITY_SCORE: 60
ADLS_ACUITY_SCORE: 66
ADLS_ACUITY_SCORE: 66
ADLS_ACUITY_SCORE: 60
ADLS_ACUITY_SCORE: 60
ADLS_ACUITY_SCORE: 66
ADLS_ACUITY_SCORE: 66
ADLS_ACUITY_SCORE: 60
ADLS_ACUITY_SCORE: 64

## 2025-05-11 NOTE — PLAN OF CARE
Problem: Pain Acute  Goal: Optimal Pain Control and Function  Outcome: Progressing  Intervention: Develop Pain Management Plan  Recent Flowsheet Documentation  Taken 5/11/2025 1249 by Ramandeep Connor, RN  Pain Management Interventions: medication (see MAR)  Taken 5/11/2025 0943 by Ramandeep Connor, RN  Pain Management Interventions: medication (see MAR)   Goal Outcome Evaluation:    Patient is alert and oriented to self only, is able to answer yes/no questions most of the time. Patient does follow directions. Pain managed this shift with cold therapy and oral medications. Patient up with assist of 2 and walker, sat in chair for meals. Patient voiding via pure wick, urine is concentrated fluids pushed patient accepts when offered. Bladder scans with in normal limits for PVR. Dressing to right hip clean, dry and intact, some bruising noted to groin, CMS intact.    Patient did have bowel movement this shift.

## 2025-05-11 NOTE — PROGRESS NOTES
[facility]    Medicine Progress Note - Hospitalist Service    Date of Admission:  5/9/2025    Assessment & Plan   79 y/o with dementia, Celiac disease, hypothyroid who lives in memory care had fall and sustained acute right fem neck fracture    1.Fall at [facility] care  -per sister, she has hx of multiple falls. Is to use walker and at times gets up at [facility] without assistance  -She notes this fall happened in bathroom she was on her own and did not call for help  -fall precautions here    2.Acute right hip fx  -POD #1 from HEMIARTHROPLASTY, HIP, BIPOLAR, Right - Hip   -asa id x 4 weeks  -per ortho antibiotics post op (please note, ortho ok with ceftriaxone we are now using for next 3 days for uti and that should cover it)  -follow up 2 weeks      3.Post op pain  -ortho ordered Toradol--watch creat daily on this  -scheduled tylenol tid  -oxy prn    4.Leukocytosis  -likely reactive to fx  -this am already down to 8    5.Covid exposure--per sister, [facility] care unit has 3 active cases now, she notes Ophelia was tested Wed and negative  -negative on admit here  -no cough or symptoms per sister  -she is vaccinated    6.hx of Celiac disease  - gluten free diet    7.Hypothyroid  -continue med    8.Dementia  -in memory care   -not on meds   -uses walker, but forgets and has hx of falls    9.Concern abnl UA  -today got cath ua  -very abnl  -concern uti  -start ceftriaxone iv x 3 days , follow UC    10.DVT prevent- scd + asa per ortho    11.Diet--gluten free    12.Code-DNR    --I called sister Adina at 1405 to update left detailed message           Diet: Discharge Instruction - Regular Diet Adult  Gluten Free Diet    DVT Prophylaxis: Pneumatic Compression Devices and asa  Valiente Catheter: Not present  Lines: None     Cardiac Monitoring: None  Code Status: No CPR- Do NOT Intubate      Clinically Significant Risk Factors          # Hyperchloremia: Highest Cl = 111 mmol/L in last 2 days, will  "monitor as appropriate      # Hypocalcemia: Lowest Ca = 8.5 mg/dL in last 2 days, will monitor and replace as appropriate                    # Obesity: Estimated body mass index is 31.32 kg/m  as calculated from the following:    Height as of this encounter: 1.702 m (5' 7\").    Weight as of this encounter: 90.7 kg (200 lb)., PRESENT ON ADMISSION            Social Drivers of Health    Depression: Not at risk (1/10/2024)    PHQ-2     PHQ-2 Score: 0   Recent Concern: Depression - At risk (11/27/2023)    PHQ-2     PHQ-2 Score: 6    Received from [employer] [facility]    Social Connections          Disposition Plan     Medically Ready for Discharge: Anticipated in 2-4 Days             Nancy Augustin MD  Hospitalist Service  [facility]  Securely message with [employer] (more info)  Text page via [employer] Paging/Directory   ______________________________________________________________________    Interval History   She woke for me  Speaks few words but followed commands  Laughed when I asked about passing gas or stool--has not yet  Some pain in hip    Physical Exam   Vital Signs: Temp: 98.1  F (36.7  C) Temp src: Axillary BP: (!) 159/70 Pulse: 63   Resp: 17 SpO2: 99 % O2 Device: Nasal cannula Oxygen Delivery: 1 LPM  Weight: 200 lbs 0 oz    Constitutional: awake, alert, cooperative, and no apparent distress  Eyes: sclera clear  Respiratory: no increased work of breathing, good air exchange, no retractions, and clear to auscultation  Cardiovascular: regular rate and rhythm and normal S1 and S2  GI: hypoactive bowel sounds, soft, non-distended, and non-tender  Skin: dressing over hip  Musculoskeletal: dressing over hip, slight edema   Neurologic: Mental Status Exam:  Level of Alertness:   awake, speaks few words  Neuropsychiatric: General: normal, calm, and normal eye contact    Medical Decision Making       55 MINUTES SPENT BY ME on the date of service doing chart review, history, " exam, documentation & further activities per the note.      Data     I have personally reviewed the following data over the past 24 hrs:    8.3  \   9.7 (L)   / 143 (L)     142 111 (H) 21.8 /  87   3.8 28 0.70 \     INR:  N/A PTT:  N/A   D-dimer:  N/A Fibrinogen:  N/A       Imaging results reviewed over the past 24 hrs:   Recent Results (from the past 24 hours)   XR Pelvis and Hip Portable Right 1 View    Narrative    EXAM: XR PELVIS AND HIP PORTABLE RIGHT 1 VIEW  LOCATION: [facility]  DATE: 5/10/2025    INDICATION: Status post Hip surgery  COMPARISON: CT and radiographs 5/9/2025       Impression    IMPRESSION: Cemented right hip hemiarthroplasty. Negative for postoperative purposes.

## 2025-05-11 NOTE — PROGRESS NOTES
05/11/25 0745   Appointment Info   Signing Clinician's Name / Credentials (OT) Zaynab Montoya ERIC OTR/L CLT   Living Environment   People in Home facility resident   Current Living Arrangements extended care facility  (memory care)   Home Accessibility no concerns   Self-Care   Activity/Exercise/Self-Care Comment Pt. unable to provide a PLOF. Able to nod yes/no to some questions.   General Information   Onset of Illness/Injury or Date of Surgery 05/09/25   Referring Physician    Additional Occupational Profile Info/Pertinent History of Current Problem 81 y/o with dementia, Celiac disease, hypothyroid who lives in memory care had fall and sustained acute right fem neck fracture   Existing Precautions/Restrictions no known precautions/restrictions   Left Lower Extremity (Weight-bearing Status) weight-bearing as tolerated (WBAT)   Right Lower Extremity (Weight-bearing Status) weight-bearing as tolerated (WBAT)   Cognitive Status Examination   Affect/Mental Status (Cognitive) flat/blunted affect   Follows Commands 0-24% accuracy;increased processing time needed   Visual Perception   Impact of Vision Impairment on Function (Vision) Nods yes/ no to some questions.   Bed Mobility   Bed Mobility supine-sit   Supine-Sit R[place] (Bed Mobility) maximum assist (25% patient effort);2 person assist   Transfers   Transfers sit-stand transfer   Sit-Stand Transfer   Sit-Stand [place] (Transfers) moderate assist (50% patient effort);maximum assist (25% patient effort);2 person assist   Sit/Stand Transfer Comments bed to chair with mod A of 2 and walker, A for safety and cognition   Balance   Balance Assessment sitting static balance   Sitting Balance: Static minimal assist   Activities of Daily Living   BADL Assessment/Intervention lower body dressing   Lower Body Dressing Assessment/Training   Ralls Level (Lower Body Dressing) shoes/slippers;maximum assist (25% patient effort)   Clinical Impression    Criteria for Skilled Therapeutic Interventions Met (OT) Evaluation only   Clinical Decision Making Complexity (OT) problem focused assessment/low complexity   OT Total Evaluation Time   OT Eval, Low Complexity Minutes (27809) 10   OT Goals   Therapy Frequency (OT) One time eval and treatment   OT Predicted Duration/Target Date for Goal Attainment 05/11/25   OT Goals Transfers   OT: Transfer Completed;Maximum assist   OT Discharge Planning   OT Plan d/c ot   OT Discharge Recommendation (DC Rec) other (see comments)  (Rtn to memory care)   OT Rationale for DC Rec OT not indicated during this acute stay.  Defer to PT to address trsfs and mobility .   OT Brief overview of current status Mod of 2   OT Total Distance Amb During Session (feet) 3   Total Session Time   Total Session Time (sum of timed and untimed services) 10

## 2025-05-11 NOTE — PLAN OF CARE
"  Problem: Adult Inpatient Plan of Care  Goal: Plan of Care Review  Description: The Plan of Care Review/Shift note should be completed every shift.  The Outcome Evaluation is a brief statement about your assessment that the patient is improving, declining, or no change.  This information will be displayed automatically on your shiftnote.  Outcome: Progressing     Problem: Adult Inpatient Plan of Care  Goal: Patient-Specific Goal (Individualized)  Description: You can add care plan individualizations to a care plan. Examples of Individualization might be:  \"Parent requests to be called daily at 9am for status\", \"I have a hard time hearing out of my right ear\", or \"Do not touch me to wake me up as it startlesme\".  Outcome: Progressing     Problem: Delirium  Goal: Improved Sleep  Outcome: Progressing     Problem: Pain Acute  Goal: Optimal Pain Control and Function  Outcome: Progressing     Problem: Orthopaedic Fracture  Goal: Fracture Stability  Outcome: Progressing     Problem: Confusion Acute  Goal: Optimal Cognitive Function  Outcome: Progressing   Goal Outcome Evaluation:  Pt is non verbal, pleasant and comfortable. VSS, RA. Scheduled Tylenol and Ketorolac given, effective, tolerating repositioning. Rt hip dressing CDI with bruising around wound. Ice packs applied. Voiding thru purewick and brief was soaked, changed.  ml/hr still running. Tolerating thin liquids well with whole pill taken 1 by 1. IV Cefazolin given. Slept between cares.                          "

## 2025-05-11 NOTE — PLAN OF CARE
"  Problem: Adult Inpatient Plan of Care  Goal: Plan of Care Review  Description: The Plan of Care Review/Shift note should be completed every shift.  The Outcome Evaluation is a brief statement about your assessment that the patient is improving, declining, or no change.  This information will be displayed automatically on your shiftnote.  5/10/2025 2145 by Denise Lynne RN  Outcome: Progressing  5/10/2025 1646 by Denise Lynne RN  Outcome: Progressing  Goal: Patient-Specific Goal (Individualized)  Description: You can add care plan individualizations to a care plan. Examples of Individualization might be:  \"Parent requests to be called daily at 9am for status\", \"I have a hard time hearing out of my right ear\", or \"Do not touch me to wake me up as it startlesme\".  5/10/2025 2145 by Denise Lynne RN  Outcome: Progressing  5/10/2025 1646 by Denise Lynne RN  Outcome: Progressing  Goal: Absence of Hospital-Acquired Illness or Injury  5/10/2025 2145 by Denise Lynne, RN  Outcome: Progressing  5/10/2025 1646 by Denise Lynne RN  Outcome: Progressing  Intervention: Prevent Skin Injury  Recent Flowsheet Documentation  Taken 5/10/2025 2047 by Denise Lynne RN  Body Position:   turned   supine, legs elevated  Taken 5/10/2025 1847 by Denise Lynne RN  Body Position:   turned   right  Intervention: Prevent Infection  Recent Flowsheet Documentation  Taken 5/10/2025 1639 by Denise Lynne RN  Infection Prevention: hand hygiene promoted  Goal: Optimal Comfort and Wellbeing  5/10/2025 2145 by Denise Lynne, RN  Outcome: Progressing  5/10/2025 1646 by Denise Lynne RN  Outcome: Progressing  Goal: Readiness for Transition of Care  5/10/2025 2145 by Denise Lynne, RN  Outcome: Progressing  5/10/2025 1646 by Denise Lynne, RN  Outcome: Progressing  Intervention: Mutually Develop Transition Plan  Recent Flowsheet Documentation  Taken 5/10/2025 1600 by Maged" Denise CHAUDHARY, RN  Equipment Currently Used at Home: walker, rolling     Problem: Delirium  Goal: Optimal Coping  5/10/2025 2145 by Denise Lynne RN  Outcome: Progressing  5/10/2025 1646 by Denise Lynne, RN  Outcome: Progressing  Goal: Improved Behavioral Control  5/10/2025 2145 by Denise Lynne, RN  Outcome: Progressing  5/10/2025 1646 by Denise Lynne, RN  Outcome: Progressing  Goal: Improved Attention and Thought Clarity  5/10/2025 2145 by Denise Lynne, RN  Outcome: Progressing  5/10/2025 1646 by Denise Lynne, RN  Outcome: Progressing  Goal: Improved Sleep  5/10/2025 2145 by Denise Lynne, RN  Outcome: Progressing  5/10/2025 1646 by Denise Lynne RN  Outcome: Progressing     Problem: Fall Injury Risk  Goal: Absence of Fall and Fall-Related Injury  5/10/2025 2145 by Denise Lynne, RN  Outcome: Progressing  5/10/2025 1646 by Denise Lynne RN  Outcome: Progressing     Goal Outcome Evaluation:  Patient is disoriented x 4 and confused. Patient is non verbal at baseline, family states she can answer some yes or no questions but does not always. Patient repositioned Q 2 hours. Scheduled pain medications and ice given, with effectiveness per patient's non verbal signs. Dressing is clean, dry and intact.

## 2025-05-11 NOTE — PROGRESS NOTES
CLINICAL NUTRITION SERVICES - ASSESSMENT NOTE    RECOMMENDATIONS FOR MDs/PROVIDERS TO ORDER:    Registered Dietitian Interventions:  Continue current diet    Future/Additional Recommendations:  Monitor po intake, labs, GOC     REASON FOR ASSESSMENT  Positive admission nutrition risk screen due to decreased appetite PTA    INFORMATION OBTAINED  Patient not available for interview due to dementia.  Spoke with pt's sister, Adina about nutrition history.  Adina states pt was eating fine PTA and has not lost any weight.  No nutrition risk factors identified at this time    CURRENT NUTRITION ORDERS  Diet: Gluten Free    INTERVENTIONS  Will f/unit(s) and reassess at LOS    GOALS  Patient to consume % of nutritionally adequate meal trays TID, or the equivalent with supplements/snacks.     MONITORING/EVALUATION  Progress toward goals will be monitored and evaluated per policy.

## 2025-05-11 NOTE — CONSULTS
Care Management Initial Consult    General Information  Assessment completed with: Adina Rodriguez (sister)  Type of CM/SW Visit: Initial Assessment    Primary Care Provider verified and updated as needed:     Readmission within the last 30 days: no previous admission in last 30 days      Reason for Consult: discharge planning  Advance Care Planning: Advance Care Planning Reviewed:  (requested sister Adina to bring in a copy of the HCD)          Communication Assessment  Patient's communication style: spoken language (English or Bilingual)    Hearing Difficulty or Deaf: no   Wear Glasses or Blind: no    Cognitive  Cognitive/Neuro/Behavioral: orientation  Level of Consciousness: confused  Arousal Level: opens eyes spontaneously  Orientation: disoriented to, situation, place, time  Mood/Behavior: cooperative     Speech: incoherent    Living Environment:   People in home: facility resident     Current living Arrangements: assisted living (memory care)  [facility]   Able to return to prior arrangements:         Family/Social Support:  Care provided by: other (see comments) (memory care staff)  Provides care for: no one, unable/limited ability to care for self  Marital Status: Single  Support system: Sibling(s), Facility resident(s)/Staff          Description of Support System: Supportive, Involved    Support Assessment: Adequate family and caregiver support    Current Resources:   Patient receiving home care services: No        Community Resources:  (memory care)  Equipment currently used at home: walker, rolling  Supplies currently used at home: None    Employment/Financial:  Employment Status: retired        Financial Concerns: none   Referral to Financial Worker: No       Does the patient's insurance plan have a 3 day qualifying hospital stay waiver?  No    Lifestyle & Psychosocial Needs:  Social Drivers of Health     Food Insecurity: Low Risk  (5/10/2025)     Food Insecurity     Within the past 12 months, did you worry that your food would run out before you got money to buy more?: No     Within the past 12 months, did the food you bought just not last and you didn t have money to get more?: No   Depression: Not at risk (1/10/2024)    PHQ-2     PHQ-2 Score: 0   Recent Concern: Depression - At risk (11/27/2023)    PHQ-2     PHQ-2 Score: 6   Housing Stability: Low Risk  (5/10/2025)    Housing Stability     Do you have housing? : Yes     Are you worried about losing your housing?: No   Tobacco Use: Low Risk  (10/25/2024)    Patient History     Smoking Tobacco Use: Never     Smokeless Tobacco Use: Never     Passive Exposure: Not on file   Financial Resource Strain: Low Risk  (5/10/2025)    Financial Resource Strain     Within the past 12 months, have you or your family members you live with been unable to get utilities (heat, electricity) when it was really needed?: No   Alcohol Use: Not on file   Transportation Needs: Low Risk  (5/10/2025)    Transportation Needs     Within the past 12 months, has lack of transportation kept you from medical appointments, getting your medicines, non-medical meetings or appointments, work, or from getting things that you need?: No   Physical Activity: Not on file   Interpersonal Safety: Low Risk  (5/10/2025)    Interpersonal Safety     Do you feel physically and emotionally safe where you currently live?: Yes     Within the past 12 months, have you been hit, slapped, kicked or otherwise physically hurt by someone?: No     Within the past 12 months, have you been humiliated or emotionally abused in other ways by your partner or ex-partner?: No   Stress: Not on file   Social Connections: Unknown (7/11/2023)    Received from [employer] [facility]    Social Connections     Frequency of Communication with Friends and Family: Not on file   Health Literacy: Not on file       Functional Status:  Prior to admission patient  needed assistance:   Dependent ADLs:: Bathing, Dressing, Incontinence, Transfers, Toileting  Dependent IADLs:: Cleaning, Cooking, Laundry, Shopping, Meal Preparation, Medication Management, Money Management, Transportation, Incontinence       Mental Health Status:  Mental Health Status: No Current Concerns       Chemical Dependency Status:  Chemical Dependency Status: No Current Concerns             Values/Beliefs:  Spiritual, Cultural Beliefs, Oriental orthodox Practices, Values that affect care: no               Discussed  Partnership in Safe Discharge Planning  document with patient/family: No    Additional Information:  Initial CM Assessment completed with Pt's sister Adina over the phone. Pt is a memory care resident at [facility]. Pt ambulates with a walker but requires assistance from staff for most I/ADLs. Pt participates in physical therapy at the [facility]. Adina is Pt's primary contact and reported HCA. Adina plans to bring in a copy of Pt's HCD when she comes to visit Pt at the hospital tomorrow.     SW discussed therapy evaluations and potential need for TCU. Adina reported she would really prefer for Pt to return to [place] if possible. SW reported CM will have to reach out to the [facility] tomorrow given it is Sunday. Adina requested an update after CM speaks with the [facility] about Pt returning at discharge.     Contacts:  Adina, sister: [phone]  [facility]  Daily Waite, : [phone]  [facility] [phone] or [phone]  Adina reported to only contact Caitlyn if CM does not receive a response from Johnathon Nair, Physical Therapy Director: [phone]  Leodan Alonso,  - [facility]: [phone]    Next Steps: CM to follow up with [place] to discuss Pt's ability to return given Pt's current mobility and needs.      SHELIA Suarez

## 2025-05-11 NOTE — PROGRESS NOTES
"Ortho Progress Note      Post-operative Day: 1      Subjective:  Pain: controlle  Chest pain, SOB:  no  Nausea, vomiting:  no  Lightheadedness, dizziness:  no  Neuro:  Patient denies new onset numbness or paresthesias    Eating breakfast in a chair.  Pain well-controlled.  No complaints.    Objective:  BP (!) 159/70 (BP Location: Left arm)   Pulse 63   Temp 98.1  F (36.7  C) (Axillary)   Resp 17   Ht 1.702 m (5' 7\")   Wt 90.7 kg (200 lb)   SpO2 99%   BMI 31.32 kg/m    Gen: A&O x 3. NAD. Appears comfortable  Wound status: c/d/i  Circulation, motion and sensation: Dorsiflexion/plantarflexion intact and equal bilaterally; distal lower extremity sensation is intact and equal bilaterally   Swelling: mild   Calf tenderness: calves are soft and non-tender bilaterally     Pertinent Labs   Lab Results: personally reviewed.   Lab Results   Component Value Date    INR 1.12 05/10/2025    INR 1.16 (H) 05/10/2025    PROTIME 14.7 05/10/2025    PROTIME 15.1 (H) 05/10/2025     Lab Results   Component Value Date    WBC 8.3 05/11/2025    HGB 9.7 (L) 05/11/2025    HCT 31.4 (L) 05/11/2025    MCV 91 05/11/2025     (L) 05/11/2025     Lab Results   Component Value Date     05/11/2025    CO2 28 05/11/2025       Assessment: POD1 s/p right hip hemiarthroplasty with Dr. Israle    Plan:   Pain Control: Continue per multimodal pain protocol. Minimize opioid pain medications as able.  Weight Bearing: Weight bearing as tolerated  Precautions: None  DVT Prophylaxis: Risk stratified DVT prophylaxis. Aspirin 81 mg BID x 4 weeks. Early ambulation and SCDs while in bed.  Antibiotics: 24 hours of perioperative IV antibiotics and 7 days PO antibiotics  Diet: Advance diet as tolerated from orthopedic perspective  GI: Plan for aggressive bowel regimen to prevent constipation from opioid medications  Lines: HLIV once tolerating PO  PT/OT: Eval and treatment. Will follow up on recommendations.  Follow up:  2 weeks with Dr. Israel " team  Discharge plan: Pending PT/OT evaluation and ongoing workup by medical team    Report completed by:  SHELDON MIRANDA MD  Date: 5/11/2025  Time: 9:14 AM

## 2025-05-11 NOTE — PROGRESS NOTES
05/11/25 0730   Appointment Info   Signing Clinician's Name / Credentials (PT) Keerthi Pelaez, PT, DPT   Living Environment   People in Home facility resident   Current Living Arrangements extended care facility  (memory care)   Home Accessibility no concerns   Self-Care   Activity/Exercise/Self-Care Comment Above information obtained per chart review. Patient only nods yes/shakes head no to questions. Unable to respond to yes/no questions regarding living environment.   General Information   Onset of Illness/Injury or Date of Surgery 05/09/25   Referring Physician Carie Tovar PA-C   Patient/Family Therapy Goals Statement (PT) None stated.   Pertinent History of Current Problem (include personal factors and/or comorbidities that impact the POC) POD #1 s/p hip hemiarthroplasty   Existing Precautions/Restrictions fall  (per orders, no hip precautions)   Weight-Bearing Status - RLE weight-bearing as tolerated   Pain Assessment   Patient Currently in Pain Yes, see Vital Sign flowsheet  (facial grimaces with mobility)   Range of Motion (ROM)   Range of Motion ROM deficits secondary to surgical procedure;ROM deficits secondary to weakness   Strength (Manual Muscle Testing)   Strength (Manual Muscle Testing) Deficits observed during functional mobility   Bed Mobility   Bed Mobility supine-sit   Supine-Sit [place] (Bed Mobility) moderate assist (50% patient effort);maximum assist (25% patient effort);2 person assist;verbal cues   Bed Mobility Limitations decreased ability to use legs for bridging/pushing;cognitive deficits   Impairments Contributing to Impaired Bed Mobility decreased strength   Assistive Device (Bed Mobility) bed rails;draw sheet   Transfers   Transfers sit-stand transfer;bed-chair transfer   Transfer Safety Concerns Noted decreased weight-shifting ability   Impairments Contributing to Impaired Transfers impaired balance;decreased strength;pain   Bed-Chair Transfer   Bed-Chair  [place] (Transfers) minimum assist (75% patient effort);moderate assist (50% patient effort);2 person assist;verbal cues   Assistive Device (Bed-Chair Transfers) walker, front-wheeled   Sit-Stand Transfer   Sit-Stand [place] (Transfers) moderate assist (50% patient effort);verbal cues;2 person assist   Assistive Device (Sit-Stand Transfers) walker, front-wheeled   Gait/Stairs (Locomotion)   [place] Level (Gait) moderate assist (50% patient effort);2 person assist;verbal cues   Assistive Device (Gait) walker, front-wheeled   Distance in Feet (Gait) 3' bed > chair   Pattern (Gait) step-to   Deviations/Abnormal Patterns (Gait) dixon decreased;gait speed decreased   Clinical Impression   Criteria for Skilled Therapeutic Intervention Yes, treatment indicated   PT Diagnosis (PT) impaired functional mobility   Influenced by the following impairments decreased strength, impaired balance, decreased cognition, pain   Functional limitations due to impairments transfers, ambulation   Clinical Presentation (PT Evaluation Complexity) evolving   Clinical Presentation Rationale Clinical judgment.   Clinical Decision Making (Complexity) moderate complexity   Planned Therapy Interventions (PT) balance training;bed mobility training;gait training;home exercise program;neuromuscular re-education;patient/family education;strengthening;ROM (range of motion);transfer training;wheelchair management/propulsion training   Risk & Benefits of therapy have been explained evaluation/treatment results reviewed;participants voiced agreement with care plan;participants included;patient   PT Total Evaluation Time   PT Eval, Moderate Complexity Minutes (26805) 20   Physical Therapy Goals   PT Frequency 6x/week   PT Predicted Duration/Target Date for Goal Attainment 05/18/25   PT Goals Bed Mobility;Transfers;Gait   PT: Bed Mobility Minimal assist;Supine to/from sit;Within precautions   PT: Transfers Minimal assist;Sit to/from  stand;Assistive device   PT: Gait Minimal assist;Assistive device;Rolling walker;25 feet   PT Discharge Planning   PT Plan bed mobility, transfers, post-op hip ex, progress to gait with FWW and chair follow as able   PT Discharge Recommendation (DC Rec) other (see comments)  (24 hour supervision/assist, assist of 2)   PT Rationale for DC Rec Patient currently requiring assist of 2 for transfers and is only tolerating a few steps to bedside chair. Per chart review, patient lives in a memory care. If memory care is able to provide 24 hour supervision/assist and hands on assist of 2 for transfers, anticipate patient could return at discharge with home PT. Recommend wheelchair for longer distances. If facility is unable to provide this level of assist, recommend TCU.   PT Brief overview of current status Supine > sit, mod to max assist of 2. Sit < >stand and pivot to bedside chair, mod assist of 2.   PT Total Distance Amb During Session (feet) 3   PT Equipment Needed at Discharge walker, rolling;wheelchair   Physical Therapy Time and Intention   Total Session Time (sum of timed and untimed services) 20

## 2025-05-11 NOTE — PLAN OF CARE
Occupational Therapy Discharge Summary    Reason for therapy discharge:    No acute OT needs    Progress towards therapy goal(s). See goals on Care Plan in [facility] electronic health record for goal details.  Goals met    Zaynab REYES, OTR/L, CLT 5/11/2025 , 10:04 AM

## 2025-05-12 ENCOUNTER — APPOINTMENT (OUTPATIENT)
Dept: PHYSICAL THERAPY | Facility: HOSPITAL | Age: 80
End: 2025-05-12
Payer: COMMERCIAL

## 2025-05-12 ENCOUNTER — DOCUMENTATION ONLY (OUTPATIENT)
Dept: OTHER | Facility: CLINIC | Age: 80
End: 2025-05-12
Payer: COMMERCIAL

## 2025-05-12 LAB
ANION GAP SERPL CALCULATED.3IONS-SCNC: 6 MMOL/L (ref 7–15)
BUN SERPL-MCNC: 14.2 MG/DL (ref 8–23)
CALCIUM SERPL-MCNC: 9 MG/DL (ref 8.8–10.4)
CHLORIDE SERPL-SCNC: 107 MMOL/L (ref 98–107)
CREAT SERPL-MCNC: 0.66 MG/DL (ref 0.51–0.95)
EGFRCR SERPLBLD CKD-EPI 2021: 88 ML/MIN/1.73M2
ERYTHROCYTE [DISTWIDTH] IN BLOOD BY AUTOMATED COUNT: 13.1 % (ref 10–15)
GLUCOSE BLDC GLUCOMTR-MCNC: 100 MG/DL (ref 70–99)
GLUCOSE SERPL-MCNC: 104 MG/DL (ref 70–99)
HCO3 SERPL-SCNC: 25 MMOL/L (ref 22–29)
HCT VFR BLD AUTO: 30.9 % (ref 35–47)
HGB BLD-MCNC: 9.9 G/DL (ref 11.7–15.7)
MCH RBC QN AUTO: 28 PG (ref 26.5–33)
MCHC RBC AUTO-ENTMCNC: 32 G/DL (ref 31.5–36.5)
MCV RBC AUTO: 88 FL (ref 78–100)
PLATELET # BLD AUTO: 164 10E3/UL (ref 150–450)
POTASSIUM SERPL-SCNC: 4.2 MMOL/L (ref 3.4–5.3)
RBC # BLD AUTO: 3.53 10E6/UL (ref 3.8–5.2)
SODIUM SERPL-SCNC: 138 MMOL/L (ref 135–145)
WBC # BLD AUTO: 8.6 10E3/UL (ref 4–11)

## 2025-05-12 PROCEDURE — 80048 BASIC METABOLIC PNL TOTAL CA: CPT | Performed by: INTERNAL MEDICINE

## 2025-05-12 PROCEDURE — 250N000011 HC RX IP 250 OP 636: Performed by: INTERNAL MEDICINE

## 2025-05-12 PROCEDURE — 85027 COMPLETE CBC AUTOMATED: CPT | Performed by: INTERNAL MEDICINE

## 2025-05-12 PROCEDURE — 120N000001 HC R&B MED SURG/OB

## 2025-05-12 PROCEDURE — 250N000013 HC RX MED GY IP 250 OP 250 PS 637: Performed by: STUDENT IN AN ORGANIZED HEALTH CARE EDUCATION/TRAINING PROGRAM

## 2025-05-12 PROCEDURE — 36415 COLL VENOUS BLD VENIPUNCTURE: CPT | Performed by: INTERNAL MEDICINE

## 2025-05-12 PROCEDURE — 250N000013 HC RX MED GY IP 250 OP 250 PS 637: Performed by: INTERNAL MEDICINE

## 2025-05-12 PROCEDURE — 99232 SBSQ HOSP IP/OBS MODERATE 35: CPT | Performed by: INTERNAL MEDICINE

## 2025-05-12 PROCEDURE — 97530 THERAPEUTIC ACTIVITIES: CPT | Mod: GP | Performed by: PHYSICAL THERAPIST

## 2025-05-12 RX ORDER — OXYCODONE HYDROCHLORIDE 5 MG/1
2.5 TABLET ORAL EVERY 4 HOURS PRN
Qty: 10 TABLET | Refills: 0 | Status: SHIPPED | OUTPATIENT
Start: 2025-05-12

## 2025-05-12 RX ORDER — OXYCODONE HYDROCHLORIDE 5 MG/1
2.5 TABLET ORAL EVERY 4 HOURS PRN
Qty: 10 TABLET | Refills: 0 | Status: SHIPPED | OUTPATIENT
Start: 2025-05-12 | End: 2025-05-12

## 2025-05-12 RX ADMIN — LEVOTHYROXINE SODIUM 137 MCG: 0.14 TABLET ORAL at 06:00

## 2025-05-12 RX ADMIN — ACETAMINOPHEN 975 MG: 325 TABLET ORAL at 10:09

## 2025-05-12 RX ADMIN — ACETAMINOPHEN 975 MG: 325 TABLET ORAL at 16:45

## 2025-05-12 RX ADMIN — SENNOSIDES AND DOCUSATE SODIUM 1 TABLET: 50; 8.6 TABLET ORAL at 10:08

## 2025-05-12 RX ADMIN — POLYETHYLENE GLYCOL 3350 17 G: 17 POWDER, FOR SOLUTION ORAL at 10:09

## 2025-05-12 RX ADMIN — CEFTRIAXONE SODIUM 1 G: 1 INJECTION, POWDER, FOR SOLUTION INTRAMUSCULAR; INTRAVENOUS at 13:49

## 2025-05-12 RX ADMIN — ACETAMINOPHEN 975 MG: 325 TABLET ORAL at 00:28

## 2025-05-12 RX ADMIN — SENNOSIDES AND DOCUSATE SODIUM 1 TABLET: 50; 8.6 TABLET ORAL at 21:10

## 2025-05-12 RX ADMIN — ASPIRIN 81 MG: 81 TABLET, COATED ORAL at 10:08

## 2025-05-12 RX ADMIN — ASPIRIN 81 MG: 81 TABLET, COATED ORAL at 21:10

## 2025-05-12 ASSESSMENT — ACTIVITIES OF DAILY LIVING (ADL)
ADLS_ACUITY_SCORE: 64
ADLS_ACUITY_SCORE: 62
ADLS_ACUITY_SCORE: 63
ADLS_ACUITY_SCORE: 67
ADLS_ACUITY_SCORE: 62
ADLS_ACUITY_SCORE: 67
ADLS_ACUITY_SCORE: 63
ADLS_ACUITY_SCORE: 62
ADLS_ACUITY_SCORE: 64
ADLS_ACUITY_SCORE: 63
ADLS_ACUITY_SCORE: 64
ADLS_ACUITY_SCORE: 64
ADLS_ACUITY_SCORE: 62

## 2025-05-12 NOTE — PROGRESS NOTES
"  Orthopedic Progress Note      Assessment: 2 Days Post-Op  S/P Procedure(s):  HEMIARTHROPLASTY, RIGHT HIP, BIPOLAR     Plan:   Pain Control: Continue per multimodal pain protocol. Minimize opioid pain medications as able.  Weight Bearing: Weight bearing as tolerated  Precautions: None  DVT Prophylaxis: Risk stratified DVT prophylaxis. Aspirin 81 mg BID x 4 weeks. Early ambulation and SCDs while in bed.  Antibiotics: 24 hours of perioperative IV antibiotics and 7 days PO antibiotics  Diet: Advance diet as tolerated from orthopedic perspective  GI: Plan for aggressive bowel regimen to prevent constipation from opioid medications  Lines: HLIV once tolerating PO  PT/OT: Eval and treatment. Will follow up on recommendations.  Follow up:  2 weeks with Dr. Israel team  Discharge plan: TCU vs return to [facility].  Orthopedically stable today      Subjective:  Patient is minimally verbal at baseline.  She is able to report that she does not have much pain, denies fever/chills today.  Nurse does report that she is able to weight-bear and pivot to transfer.  She is up in the chair during my visit today.  Pain seems slightly increased today.      Objective:  /59 (BP Location: Left arm)   Pulse 67   Temp 98.6  F (37  C) (Oral)   Resp 16   Ht 1.702 m (5' 7\")   Wt 90.7 kg (200 lb)   SpO2 94%   BMI 31.32 kg/m      The patient is A&Ox3. Appears comfortable, sitting up at bedside.  Calves without tenderness, neg Dleio's  Brisk capillary refill in the toes.   Palpable Right dorsalis pedis pulse. Right foot warm & well-perfused.  Sensation is intact to light touch & equal bilaterally in the femoral, DP, SP & tibial nerve distributions.  ROM: Appropriately flexes & extends all toes bilaterally.   Motor: +5/5 dorsiflexion, plantar flexion & EHL bilaterally.   Leg lengths equal.  Dressing C/D/I without strikethrough, no surrounding erythema.      No drain     Pertinent Labs   Lab Results: personally reviewed.   Lab Results "   Component Value Date    INR 1.12 05/10/2025    INR 1.16 (H) 05/10/2025    PROTIME 14.7 05/10/2025    PROTIME 15.1 (H) 05/10/2025     Lab Results   Component Value Date    WBC 8.6 05/12/2025    HGB 9.9 (L) 05/12/2025    HCT 30.9 (L) 05/12/2025    MCV 88 05/12/2025     05/12/2025     Lab Results   Component Value Date     05/12/2025    CO2 25 05/12/2025         Report completed by:  PHILIP TORO PA-C  Date: 05/12/2025  [facility]

## 2025-05-12 NOTE — PLAN OF CARE
"  Problem: Adult Inpatient Plan of Care  Goal: Plan of Care Review  Description: The Plan of Care Review/Shift note should be completed every shift.  The Outcome Evaluation is a brief statement about your assessment that the patient is improving, declining, or no change.  This information will be displayed automatically on your shiftnote.  Outcome: Progressing     Problem: Adult Inpatient Plan of Care  Goal: Patient-Specific Goal (Individualized)  Description: You can add care plan individualizations to a care plan. Examples of Individualization might be:  \"Parent requests to be called daily at 9am for status\", \"I have a hard time hearing out of my right ear\", or \"Do not touch me to wake me up as it startlesme\".  Outcome: Progressing     Problem: Delirium  Goal: Improved Behavioral Control  Outcome: Progressing     Problem: Delirium  Goal: Improved Sleep  Outcome: Progressing     Problem: Pain Acute  Goal: Optimal Pain Control and Function  Outcome: Progressing     Problem: Orthopaedic Fracture  Goal: Fracture Stability  Outcome: Progressing     Problem: Orthopaedic Fracture  Goal: Effective Oxygenation and Ventilation  Outcome: Progressing   Goal Outcome Evaluation:  Pt remains non verbal. Quiet and no facial sign of pain. VSS. Afebrile, RA. Dressing to left hip intact. Repositioning done. Urinating through purewick. Slept well. Hgb at 06AM 9.9, .                          "

## 2025-05-12 NOTE — PROGRESS NOTES
Care Management Follow Up    Length of Stay (days): 3    Expected Discharge Date: 05/13/2025    Anticipated Discharge Plan:   Transitional care    Transportation: anticipate medical     PT Recommendations: other (see comments) (24 hour supervision/assist, assist of 2 for transfers, lift)  OT Recommendations:  other (see comments) (Rtn to memory care)     Barriers to Discharge: medical stability, placement    Prior Living Situation: assisted living (memory care) with facility resident    Discussed  Partnership in Safe Discharge Planning  document with patient/family: No     Handoff Completed: No, handoff not indicated or clinically appropriate    Patient/Spokesperson Updated: Yes. Who? Sister Adina    Additional Information:    SW spoke to patient's sister Adina at bedside today. Adina provided copies of healthcare directive which have been placed in hard chart and sent to [employer]. SW discussed TCU with patient's sister, sister reports she has not TCU choice preference when offered choice list on TCU. Sister lives in [place].  Update from Chad SEAY at [facility] where pt resides ([phone]). [facility] not able to accept pt back with mechanical lift needs and recommend TCU .   Next Steps: TCU referrals sent    RAPHAEL Hernandez

## 2025-05-12 NOTE — PROGRESS NOTES
"[facility]    PROGRESS NOTE - Hospitalist Service    ASSESSMENT AND PLAN     Active Problems:    Hip fracture, right, closed, initial encounter (H)    Ophelia Spangler is a 80 year old female with dementia, Celiac disease, hypothyroid who lives in memory care had fall and sustained acute right femoral neck fracture     Fall at [facility]  Acute right hip fx  -POD #2 from HEMIARTHROPLASTY, HIP, BIPOLAR, Right - Hip   -SA 4 weeks  -Follow up 2 weeks       Post op pain  -ortho ordered Toradol--watch creat daily on this  -scheduled tylenol tid  -oxy prn    Dementia  -From [facility]- Will need TCU as facility cannot support 2 person lift   -Uses walker, but forgets and has hx of falls    UTI  Ceftriaxone  Urine culture pending     Leukocytosis- resolved   -likely reactive       Hx of Celiac disease  - gluten free diet     Hypothyroid  -Continue levothyroxine         Barriers to discharge: TCU placement     Anticipated length of stay: 1 more day    Medically Ready for Discharge: Ready Now    Clinically Significant Risk Factors          # Hyperchloremia: Highest Cl = 111 mmol/L in last 2 days, will monitor as appropriate                         # Obesity: Estimated body mass index is 31.32 kg/m  as calculated from the following:    Height as of this encounter: 1.702 m (5' 7\").    Weight as of this encounter: 90.7 kg (200 lb)., PRESENT ON ADMISSION     # Financial/Environmental Concerns: none             Subjective:  Patient resting in a chair.  Making good eye contact during evaluation but not answering questions appropriately.  She really at bedside.  Discussed discharge plan.  Answered all questions.    PHYSICAL EXAM  Temp:  [97.9  F (36.6  C)-98.7  F (37.1  C)] 98.6  F (37  C)  Pulse:  [67-73] 67  Resp:  [16-18] 16  BP: (110-134)/(58-62) 128/59  SpO2:  [92 %-96 %] 94 %  Wt Readings from Last 1 Encounters:   05/09/25 90.7 kg (200 lb)       Intake/Output Summary (Last 24 hours) at " 5/12/2025 1247  Last data filed at 5/12/2025 1151  Gross per 24 hour   Intake 1840 ml   Output 3025 ml   Net -1185 ml      Body mass index is 31.32 kg/m .    GENRL: Alert. Not in acute distress. Sitting in a chair   CHEST: Breathing easily   EXTRM: + pedal edema  PSYCH: flat affect and mood.   INTGM: No skin rash    Medical Decision Making       35 MINUTES SPENT BY ME on the date of service doing chart review, history, exam, documentation & further activities per the note.      PERTINENT LABS/IMAGING:  Results for orders placed or performed during the hospital encounter of 05/09/25   CT Head w/o Contrast    Impression    IMPRESSION:  HEAD CT:  1.  No CT evidence for acute intracranial process.  2.  Brain atrophy and presumed chronic microvascular ischemic changes as above.    CERVICAL SPINE CT:  1.  No CT evidence for acute fracture or post traumatic subluxation.  2.  Cervical spondylosis as above.     CT Cervical Spine w/o Contrast    Impression    IMPRESSION:  HEAD CT:  1.  No CT evidence for acute intracranial process.  2.  Brain atrophy and presumed chronic microvascular ischemic changes as above.    CERVICAL SPINE CT:  1.  No CT evidence for acute fracture or post traumatic subluxation.  2.  Cervical spondylosis as above.     XR Pelvis and Hip Right 2 Views    Impression    IMPRESSION: There is probably a right femoral neck fracture, but the views are unconventional and the hip is internally rotated, so assessment is limited. Repeat right hip radiographs or further evaluation with CT suggested. Degenerative changes in the   spine. Mild osteoarthrosis of the left SI joint.   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 Diffuse bone demineralization. No fracture, effusion or calcified intra-articular body of the knee.   CT Hip Right w/o Contrast    Impression    IMPRESSION:  1.  Significant angulated/displaced comminuted fracture involving the right  "femoral neck.    2.  No significant hematoma identified.     XR Pelvis and Hip Portable Right 1 View    Impression    IMPRESSION: Cemented right hip hemiarthroplasty. Negative for postoperative purposes.      Most Recent 3 CBC's:  Recent Labs   Lab Test 05/12/25 0530 05/11/25  0532 05/10/25  1027   WBC 8.6 8.3 8.2   HGB 9.9* 9.7* 12.1   MCV 88 91 87    143* 186     Most Recent 3 BMP's:  Recent Labs   Lab Test 05/12/25  0634 05/12/25  0530 05/11/25  0711 05/11/25  0532 05/10/25  0606   NA  --  138  --  142 141   POTASSIUM  --  4.2  --  3.8 3.9   CHLORIDE  --  107  --  111* 109*   CO2  --  25  --  28 24   BUN  --  14.2  --  21.8 21.5   CR  --  0.66  --  0.70 0.62   ANIONGAP  --  6*  --  3* 8   SAURABH  --  9.0  --  8.5* 9.3   * 104* 87 98 100*     Most Recent 2 LFT's:  Recent Labs   Lab Test 05/09/25  2006 10/25/24  0935   AST 21 22   ALT 18 10   ALKPHOS 104 86   BILITOTAL 0.5 0.3       Recent Labs   Lab Test 10/25/24  0935   CHOL 231*   HDL 61   *   TRIG 98     Recent Labs   Lab Test 10/25/24  0935 01/10/24  1407   * 151*     Recent Labs   Lab Test 05/12/25 0634 05/12/25  0530   NA  --  138   POTASSIUM  --  4.2   CHLORIDE  --  107   CO2  --  25   * 104*   BUN  --  14.2   CR  --  0.66   GFRESTIMATED  --  88   SAURABH  --  9.0     Recent Labs   Lab Test 01/10/24  1407   A1C 5.4     Recent Labs   Lab Test 05/12/25 0530 05/11/25  0532 05/10/25  1027   HGB 9.9* 9.7* 12.1     No results for input(s): \"TROPONINI\" in the last 43458 hours.  No results for input(s): \"BNP\", \"NTBNPI\", \"NTBNP\" in the last 62650 hours.  Recent Labs   Lab Test 05/06/25  1015   TSH 0.42     Recent Labs   Lab Test 05/10/25  1027 05/10/25  0606   INR 1.12 1.16*       Padmini Mack, DO  Hospitalist Service  [facility]      "

## 2025-05-12 NOTE — PROGRESS NOTES
[facility] ([facility])  SPIRITUAL ASSESSMENT Progress Note  [facility]. Unit P4    REFERRAL SOURCE: Thank you for the verbal consult.      At the family's request I went to visit Ophelia and offer prayer with her.  She did not understand what I was doing nor did she attempt to join in the prayer, the Lord's prayer, when I prayed it for her.  She had very flat affect and stared blanklly at me. However, when I waved goodbye at the end of my encounter a whisper of a smile floated over her fact.      PLAN: [facility] remain available to provide ongoing prayer support for Ophelia if needed.      Vidya Britt, Ph.D., [facility]      Securely Message with [employer] or [employer] Pager.    Non-urgent needs:  Phone  to leave a message

## 2025-05-12 NOTE — PLAN OF CARE
Problem: Orthopaedic Fracture  Goal: Optimal Functional Ability  Outcome: Progressing   Goal Outcome Evaluation:     Patient continues to be alert and oriented to self only with minimal verbal response. Patient assisted in to chair with assist of two, patient seemed to ave more pain as evidenced by facial grimacing no evidence of pain after transfer is complete. Dressing is clean dry and intact, CMS intact. Patient has good oral intake along with good output.

## 2025-05-12 NOTE — PLAN OF CARE
"  Problem: Adult Inpatient Plan of Care  Goal: Plan of Care Review  Description: The Plan of Care Review/Shift note should be completed every shift.  The Outcome Evaluation is a brief statement about your assessment that the patient is improving, declining, or no change.  This information will be displayed automatically on your shiftnote.  Outcome: Progressing  Goal: Patient-Specific Goal (Individualized)  Description: You can add care plan individualizations to a care plan. Examples of Individualization might be:  \"Parent requests to be called daily at 9am for status\", \"I have a hard time hearing out of my right ear\", or \"Do not touch me to wake me up as it startlesme\".  Outcome: Progressing  Goal: Absence of Hospital-Acquired Illness or Injury  Outcome: Progressing  Intervention: Prevent Skin Injury  Recent Flowsheet Documentation  Taken 5/11/2025 1847 by Denise Lynne, RN  Body Position:   right   turned  Intervention: Prevent Infection  Recent Flowsheet Documentation  Taken 5/11/2025 1556 by Denise Lynne, RN  Infection Prevention:   hand hygiene promoted   rest/sleep promoted   single patient room provided  Goal: Optimal Comfort and Wellbeing  Outcome: Progressing  Goal: Readiness for Transition of Care  Outcome: Progressing     Problem: Orthopaedic Fracture  Goal: Absence of Bleeding  Outcome: Progressing  Goal: Bowel Elimination  Outcome: Progressing  Goal: Absence of Embolism Signs and Symptoms  Outcome: Progressing  Goal: Fracture Stability  Outcome: Progressing  Goal: Optimal Functional Ability  Outcome: Progressing  Intervention: Optimize Functional Ability  Recent Flowsheet Documentation  Taken 5/11/2025 1847 by Denise Lynne, RN  Positioning/Transfer Devices:   pillows   in use  Goal: Absence of Infection Signs and Symptoms  Outcome: Progressing  Goal: Effective Tissue Perfusion  Outcome: Progressing  Goal: Optimal Pain Control and Function  Outcome: Progressing  Goal: Effective " Oxygenation and Ventilation  Outcome: Progressing  Intervention: Optimize Oxygenation and Ventilation  Recent Flowsheet Documentation  Taken 5/11/2025 1847 by Denise Lynne, RN  Head of Bed (HOB) Positioning: HOB at 20-30 degrees     Goal Outcome Evaluation:  Patient is non verbal at baseline, able to answer yes or no questions but is not always consistent. Able to follow directions. Pain controlled with scheduled tylenol and PRN oxycodone x 1. Patient's right hip to inner grown has mild edema and bruising. CMS intact. Using the pure wick with adequate output.

## 2025-05-13 VITALS
BODY MASS INDEX: 31.39 KG/M2 | RESPIRATION RATE: 26 BRPM | DIASTOLIC BLOOD PRESSURE: 60 MMHG | SYSTOLIC BLOOD PRESSURE: 129 MMHG | OXYGEN SATURATION: 95 % | WEIGHT: 200 LBS | HEART RATE: 68 BPM | TEMPERATURE: 97.9 F | HEIGHT: 67 IN

## 2025-05-13 LAB — BACTERIA UR CULT: ABNORMAL

## 2025-05-13 PROCEDURE — 99239 HOSP IP/OBS DSCHRG MGMT >30: CPT | Performed by: INTERNAL MEDICINE

## 2025-05-13 PROCEDURE — 250N000011 HC RX IP 250 OP 636: Performed by: INTERNAL MEDICINE

## 2025-05-13 PROCEDURE — 250N000013 HC RX MED GY IP 250 OP 250 PS 637: Performed by: INTERNAL MEDICINE

## 2025-05-13 PROCEDURE — 250N000013 HC RX MED GY IP 250 OP 250 PS 637: Performed by: STUDENT IN AN ORGANIZED HEALTH CARE EDUCATION/TRAINING PROGRAM

## 2025-05-13 RX ADMIN — CEFTRIAXONE SODIUM 1 G: 1 INJECTION, POWDER, FOR SOLUTION INTRAMUSCULAR; INTRAVENOUS at 13:25

## 2025-05-13 RX ADMIN — POLYETHYLENE GLYCOL 3350 17 G: 17 POWDER, FOR SOLUTION ORAL at 09:20

## 2025-05-13 RX ADMIN — ACETAMINOPHEN 975 MG: 325 TABLET ORAL at 09:20

## 2025-05-13 RX ADMIN — SENNOSIDES AND DOCUSATE SODIUM 1 TABLET: 50; 8.6 TABLET ORAL at 09:20

## 2025-05-13 RX ADMIN — ASPIRIN 81 MG: 81 TABLET, COATED ORAL at 09:20

## 2025-05-13 RX ADMIN — ACETAMINOPHEN 975 MG: 325 TABLET ORAL at 00:01

## 2025-05-13 RX ADMIN — LEVOTHYROXINE SODIUM 137 MCG: 0.14 TABLET ORAL at 06:42

## 2025-05-13 ASSESSMENT — ACTIVITIES OF DAILY LIVING (ADL)
ADLS_ACUITY_SCORE: 67
ADLS_ACUITY_SCORE: 67
ADLS_ACUITY_SCORE: 75
ADLS_ACUITY_SCORE: 67
ADLS_ACUITY_SCORE: 79
ADLS_ACUITY_SCORE: 67
ADLS_ACUITY_SCORE: 79
ADLS_ACUITY_SCORE: 67

## 2025-05-13 NOTE — PLAN OF CARE
"  Problem: Adult Inpatient Plan of Care  Goal: Plan of Care Review  Description: The Plan of Care Review/Shift note should be completed every shift.  The Outcome Evaluation is a brief statement about your assessment that the patient is improving, declining, or no change.  This information will be displayed automatically on your shiftnote.  Outcome: Progressing     Problem: Adult Inpatient Plan of Care  Goal: Patient-Specific Goal (Individualized)  Description: You can add care plan individualizations to a care plan. Examples of Individualization might be:  \"Parent requests to be called daily at 9am for status\", \"I have a hard time hearing out of my right ear\", or \"Do not touch me to wake me up as it startlesme\".  Outcome: Progressing     Problem: Delirium  Goal: Improved Behavioral Control  Outcome: Progressing     Problem: Delirium  Goal: Improved Sleep  Outcome: Progressing     Problem: Pain Acute  Goal: Optimal Pain Control and Function  Outcome: Progressing     Problem: Orthopaedic Fracture  Goal: Fracture Stability  Outcome: Progressing   Goal Outcome Evaluation:       Pt is non verbal. No s/sx of pain noted. VSS, RA. Rt hip mepilex dressing clean and intact. Repositioning and brief changed done. Placed new purewick. Drinking water adequately. Slept well.                     "

## 2025-05-13 NOTE — DISCHARGE SUMMARY
"[facility]  Hospitalist Discharge Summary      Date of Admission:  5/9/2025  Date of Discharge:  5/13/2025  Discharging Provider: Stefan Noble MD  Discharge Service: Hospitalist Service    Discharge Diagnoses   Fall at [facility]  Acute right hip fracture  Dementia  UTI    Clinically Significant Risk Factors     # Obesity: Estimated body mass index is 31.32 kg/m  as calculated from the following:    Height as of this encounter: 1.702 m (5' 7\").    Weight as of this encounter: 90.7 kg (200 lb).       Follow-ups Needed After Discharge   Follow-up Appointments       Follow Up Care      Follow-up with your Surgeon Team in 2 weeks for wound check. If you have any questions for the team before, contact Team Lindy using .        Follow Up and recommended labs and tests      Follow up with group home physician.  The following labs/tests are recommended: cbc, bmp.                Unresulted Labs Ordered in the Past 30 Days of this Admission       No orders found from 4/9/2025 to 5/10/2025.            Discharge Disposition   Discharged to TCU  Condition at discharge: Fair    Hospital Course   Ophelia Spangler is a 80 year old female with dementia, Celiac disease, hypothyroid who lives in [facility] had fall and sustained acute right femoral neck fracture     Fall at [facility]  Acute right hip fx  -s/p HEMIARTHROPLASTY, HIP, BIPOLAR, Right - Hip   -ASA 4 weeks for DVT prophylaxis  -Follow up 2 weeks with       Post op pain  -ortho ordered Toradol--watch creat daily on this  -scheduled tylenol tid  -oxy prn     Dementia  -From [facility]  - Will need TCU as facility cannot support 2 person lift   -Uses walker, but forgets and has hx of falls     UTI  Ceftriaxone in hospital; Duricef for 3 more days at discharge  Urine culture unremarkable      Leukocytosis- resolved   -likely reactive       Hx of Celiac disease  - gluten free diet     Hypothyroid  -Continue " "levothyroxine     Consultations This Hospital Stay   ORTHOPEDIC SURGERY IP CONSULT  ORTHOPEDIC SURGERY IP CONSULT  PHYSICAL THERAPY ADULT IP CONSULT  OCCUPATIONAL THERAPY ADULT IP CONSULT  CARE MANAGEMENT / SOCIAL WORK IP CONSULT  PHYSICAL THERAPY ADULT IP CONSULT  OCCUPATIONAL THERAPY ADULT IP CONSULT    Code Status   No CPR- Do NOT Intubate    Time Spent on this Encounter   I, Stefan Noble MD, personally saw the patient today and spent greater than 30 minutes discharging this patient.       Stefan Noble MD  [address] [address]  Phone: [phone]  Fax: [phone]  ______________________________________________________________________    Physical Exam   Vital Signs: Temp: 97.9  F (36.6  C) Temp src: Oral BP: 129/60 Pulse: 68   Resp: 26 SpO2: 95 % O2 Device: None (Room air)    Weight: 200 lbs 0 oz  General.  Awake alert not in acute distress. Poor historian due to dementia; obese  HEENT.  Pupils equal round react to light, anicteric, EOM intact.  Neck supple no JVD.  CVS regular rhythm no murmur gallops.  Lungs.  Clear to auscultation bilateral no wheezing or rales.  Abdomen.  Soft nontender bowel sounds present.  Extremities.  No edema no calf tenderness.  Neurological.  +general weakness  Skin no rash. No pallor.  Psych. Impaired memory and cognition      Primary Care Physician   Gianfranco Doyle    Discharge Orders      Reason for your hospital stay    Right partial hip replacement with Dr. Israel.     When to call - Contact Surgeon Team    You may experience symptoms that require follow-up before your scheduled appointment. Refer to the \"[employer]\" for instructions on when to contact your Surgeon Team if you are concerned about pain control, blood clots, constipation, or if you are unable to urinate.     When to call - Reach out to Urgent Care    If you are not able to reach your Surgeon Team and you need immediate care, go to the " Orthopedic Walk-in Clinic or Urgent Care at your Surgeon's office.  Do NOT go to the Emergency Room unless you have shortness of breath, chest pain, or other signs of a medical emergency.     When to call - Reasons to Call 911    Call 911 immediately if you experience sudden-onset chest pain, arm weakness/numbness, slurred speech, or shortness of breath     Discharge Instruction - Breathing exercises    Perform breathing exercises using your Incentive Spirometer 10 times per hour while awake for 2 weeks.     Symptoms - Fever Management    A low grade fever can be expected after surgery.  Use acetaminophen (TYLENOL) as needed for fever management.  Contact your Surgeon Team if you have a fever greater than 101.5 F, chills, and/or night sweats.     Symptoms - Constipation management    Constipation (hard, dry bowel movements) is expected after surgery due to the combination of being less active, the anesthetic, and the opioid pain medication.  You can do the following to help reduce constipation:  ~  FLUIDS:  Drink clear liquids (water or Gatorade), or juice (apple/prune).  ~  DIET:  Eat a fiber rich diet.    ~  ACTIVITY:  Get up and move around several times a day.  Increase your activity as you are able.  MEDICATIONS:  Reduce the risk of constipation by starting medications before you are constipated.  You can take Miralax   (1 packet as directed) and/or a stool softener (Senokot 1-2 tablets 1-2 times a day).  If you already have constipation and these medications are not working, you can get magnesium citrate and use as directed.  If you continue to have constipation you can try an over the counter suppository or enema.  Call your Surgeon Team if it has been greater than 3 days since your last bowel movement.     Symptoms - Reduced Urine Output    Changes in the amount of fluids you drank before and after surgery may result in problems urinating.  It is important to stay well-hydrated after surgery and drink plenty  of water. If it has been greater than 8 hours since you have urinated despite drinking plenty of water, call your Surgeon Team.     Medication instructions -  Anticoagulation - aspirin    Take the aspirin as prescribed for a total of four weeks after surgery.  This is given to help minimize your risk of blood clot.     Activity - Exercises to prevent blood clots    Unless otherwise directed by your Surgeon team, perform the following exercises at least three times per day for the first four weeks after surgery to prevent blood clots in your legs: 1) Point and flex your feet (Ankle Pumps), 2) Move your ankle around in big circles, 3) Wiggle your toes, 4) Walk, even for short distances, several times a day, will help decrease the risk of blood clots.     Comfort and Pain Management - Pain after Surgery    Pain after surgery is normal and expected.  You will have some amount of pain for several weeks after surgery.  Your pain will improve with time.  There are several things you can do to help reduce your pain including: rest, ice, elevation, and using pain medications as needed. Contact your Surgeon Team if you have pain that persists or worsens after surgery despite rest, ice, elevation, and taking your medication(s) as prescribed. Contact your Surgeon Team if you have new numbness, tingling, or weakness in your operative extremity.     Comfort and Pain Management - Swelling after Surgery    Swelling and/or bruising of the surgical extremity is common and may persist for several months after surgery. In addition to frequent icing and elevation, gentle compressive support with an ACE wrap or tubigrip may help with swelling. Apply compression regularly, removing at least twice daily to perform skin checks. Contact your Surgeon Team if your swelling increases and is NOT associated with an increase in your activity level, or if your swelling increases and is associated with redness and pain.     Comfort and Pain  "Management - LOWER Extremity Elevation    Swelling is expected for several months after surgery. This type of swelling is usually associated with gravity and activity, and can be improved with elevation.   The best way to do this is to get your \"toes above your nose\" by laying down and placing several pillows lengthwise under your calf and heel. When elevating your leg keep your knee completely straight. Perform this elevation as often as possible especially for the first two weeks after surgery.     Comfort and Pain Management - Cold therapy    Ice can be used to control swelling and discomfort after surgery. Place a thin towel over your operative site and apply the ice pack overtop. Leave ice pack in place for 20 minutes, then remove for 20 minutes. Repeat this 20 minutes on/20 minutes off routine as often as tolerated.     Medication Instructions - Acetaminophen (TYLENOL) Instructions    You were discharged with acetaminophen (TYLENOL) for pain management after surgery. Acetaminophen most effectively manages pain symptoms when it is taken on a schedule without missing doses (every four, six, or eight hours). Your Provider will prescribe a safe daily dose between 3000 - 4000 mg.  Do NOT exceed this daily dose. Most patients use acetaminophen for pain control for the first four weeks after surgery.  You can wean from this medication as your pain decreases.     Follow Up Care    Follow-up with your Surgeon Team in 2 weeks for wound check. If you have any questions for the team before, contact Team Lindy using .     Activity - Total Hip Arthroplasty    Refer to the [facility] \"Your Guide to Total Joint Replacement\" for recommendations on activities and Exercises.     Return to Driving    Return to driving - Driving is NOT permitted until directed by your provider. Under no circumstance are you permitted to drive while using narcotic pain medications.     No precautions    No precautions directed by " your Provider.  You may perform range of motion activities as tolerated.     Weight bearing as tolerated    Weight bearing as tolerated on your operative extremity.     Dressing / Wound Care - Wound    You have a clean dressing on your surgical wound. Dressing change instructions as follows: dressing will be removed at your follow-up appointment. Contact your Surgeon Team if you have increased redness, warmth around the surgical wound, and/or drainage from the surgical wound.     Dressing / Wound care - Shower with wound/dressing covered    You must COVER your dressing or incision with saran wrap (or any other non-permeable covering) to allow the incision to remain dry while showering.  You may shower 0 days after surgery as long as the surgical wound stays dry. Continue to cover your dressing or incision for showering until your first office visit.  You are strictly prohibited from soaking   or submerging the surgical wound underwater.     Dressing / Wound Care - NO Tub Bathing    Tub bathing, swimming, or any other activities that will cause your incision to be submerged in water should be avoided until allowed by your Surgeon.     Medication Instructions - NSAID Instructions    You were discharged with an anti-inflammatory medication for pain management to use in combination with acetaminophen (TYLENOL) and the narcotic pain medication.  Take this medication exactly as directed.  You should only take one anti-inflammatory at a time.  Some common anti-inflammatories include: ibuprofen (ADVIL, MOTRIN), naproxen (ALEVE, NAPROSYN), celecoxib (CELEBREX), meloxicam (MOBIC), ketorolac (TORADOL).  Take this medication with food and water.     General info for SNF    Length of Stay Estimate: Short Term Care: Estimated # of Days <30  Condition at Discharge: Improving  Level of care:skilled   Rehabilitation Potential: Good  Admission H&P remains valid and up-to-date: Yes  Recent Chemotherapy: N/A  Use Nursing Home Standing  Orders: Yes     Mantoux instructions    Give two-step Mantoux (PPD) Per Facility Policy Yes     Follow Up and recommended labs and tests    Follow up with [facility] physician.  The following labs/tests are recommended: cbc, bmp.     Reason for your hospital stay    Fall  Right hip fracture s/p surgery  Dementia  UTI     Activity - Up with nursing assistance     Physical Therapy Adult Consult    Evaluate and treat as clinically indicated.    Reason:  fall, hip fracture s/p surgery     Occupational Therapy Adult Consult    Evaluate and treat as clinically indicated.    Reason:    fall, hip fracture s/p surgery     Fall precautions     Discharge Instruction - Regular Diet Adult    Return to your pre-surgery diet unless instructed otherwise     Diet    Follow this diet upon discharge: Current Diet:Orders Placed This Encounter      Discharge Instruction - Regular Diet Adult      Gluten Free Diet       Significant Results and Procedures   Most Recent 3 CBC's:  Recent Labs   Lab Test 05/12/25  0530 05/11/25  0532 05/10/25  1027   WBC 8.6 8.3 8.2   HGB 9.9* 9.7* 12.1   MCV 88 91 87    143* 186     Most Recent 3 BMP's:  Recent Labs   Lab Test 05/12/25  0634 05/12/25  0530 05/11/25  0711 05/11/25  0532 05/10/25  0606   NA  --  138  --  142 141   POTASSIUM  --  4.2  --  3.8 3.9   CHLORIDE  --  107  --  111* 109*   CO2  --  25  --  28 24   BUN  --  14.2  --  21.8 21.5   CR  --  0.66  --  0.70 0.62   ANIONGAP  --  6*  --  3* 8   SAURABH  --  9.0  --  8.5* 9.3   * 104* 87 98 100*   ,   Results for orders placed or performed during the hospital encounter of 05/09/25   CT Head w/o Contrast    Narrative    EXAM: CT HEAD W/O CONTRAST, CT CERVICAL SPINE W/O CONTRAST  LOCATION: [facility]  DATE: 5/9/2025    INDICATION: trauma  COMPARISON: April 25, 2016  TECHNIQUE:   1) Routine CT Head without IV contrast. Multiplanar reformats. Dose reduction techniques were used.  2) Routine CT Cervical Spine  without IV contrast. Multiplanar reformats. Dose reduction techniques were used.    FINDINGS:   HEAD CT:   INTRACRANIAL CONTENTS: No intracranial hemorrhage, extraaxial collection, or mass effect.  No CT evidence of acute infarct. Mild to moderate presumed chronic small vessel ischemic changes. Moderate generalized volume loss. No hydrocephalus.     VISUALIZED ORBITS/SINUSES/MASTOIDS: Prior bilateral cataract surgery. Visualized portions of the orbits are otherwise unremarkable. No paranasal sinus mucosal disease. No middle ear or mastoid effusion.    BONES/SOFT TISSUES: No acute abnormality.    CERVICAL SPINE CT:   VERTEBRA: Normal vertebral body heights and alignment. No fracture or posttraumatic subluxation.     CANAL/FORAMINA: No severe spinal canal stenosis. Moderate spinal canal stenosis C4-C5. Severe osseous foraminal stenosis left at C6-C7. Multiple moderate foraminal stenoses elsewhere.    PARASPINAL: No extraspinal abnormality. Visualized lung fields are clear.      Impression    IMPRESSION:  HEAD CT:  1.  No CT evidence for acute intracranial process.  2.  Brain atrophy and presumed chronic microvascular ischemic changes as above.    CERVICAL SPINE CT:  1.  No CT evidence for acute fracture or post traumatic subluxation.  2.  Cervical spondylosis as above.     CT Cervical Spine w/o Contrast    Narrative    EXAM: CT HEAD W/O CONTRAST, CT CERVICAL SPINE W/O CONTRAST  LOCATION: [facility]  DATE: 5/9/2025    INDICATION: trauma  COMPARISON: April 25, 2016  TECHNIQUE:   1) Routine CT Head without IV contrast. Multiplanar reformats. Dose reduction techniques were used.  2) Routine CT Cervical Spine without IV contrast. Multiplanar reformats. Dose reduction techniques were used.    FINDINGS:   HEAD CT:   INTRACRANIAL CONTENTS: No intracranial hemorrhage, extraaxial collection, or mass effect.  No CT evidence of acute infarct. Mild to moderate presumed chronic small vessel ischemic  changes. Moderate generalized volume loss. No hydrocephalus.     VISUALIZED ORBITS/SINUSES/MASTOIDS: Prior bilateral cataract surgery. Visualized portions of the orbits are otherwise unremarkable. No paranasal sinus mucosal disease. No middle ear or mastoid effusion.    BONES/SOFT TISSUES: No acute abnormality.    CERVICAL SPINE CT:   VERTEBRA: Normal vertebral body heights and alignment. No fracture or posttraumatic subluxation.     CANAL/FORAMINA: No severe spinal canal stenosis. Moderate spinal canal stenosis C4-C5. Severe osseous foraminal stenosis left at C6-C7. Multiple moderate foraminal stenoses elsewhere.    PARASPINAL: No extraspinal abnormality. Visualized lung fields are clear.      Impression    IMPRESSION:  HEAD CT:  1.  No CT evidence for acute intracranial process.  2.  Brain atrophy and presumed chronic microvascular ischemic changes as above.    CERVICAL SPINE CT:  1.  No CT evidence for acute fracture or post traumatic subluxation.  2.  Cervical spondylosis as above.     XR Pelvis and Hip Right 2 Views    Narrative    EXAM: XR PELVIS AND HIP RIGHT 2 VIEWS  LOCATION: [facility]  DATE: 5/9/2025    INDICATION: Trauma.  COMPARISON: None.      Impression    IMPRESSION: There is probably a right femoral neck fracture, but the views are unconventional and the hip is internally rotated, so assessment is limited. Repeat right hip radiographs or further evaluation with CT suggested. Degenerative changes in the   spine. Mild osteoarthrosis of the left SI joint.   XR Knee Right 1/2 Views    Narrative    EXAM: XR KNEE RIGHT 1/2 VIEWS  LOCATION: [facility]  DATE: 5/9/2025    INDICATION: Trauma  COMPARISON: None.      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 Diffuse bone demineralization. No fracture, effusion or calcified intra-articular body of the knee.   CT Hip Right w/o Contrast    Narrative    EXAM: CT HIP RIGHT W/O  CONTRAST  LOCATION: [facility]  DATE: 5/9/2025    INDICATION: fall hip pain non diagnostic xray  COMPARISON: X-ray 5/9/2025  TECHNIQUE: Noncontrast. Axial, sagittal and coronal thin-section reconstruction. Dose reduction techniques were used.     FINDINGS:     BONES:  -There is a significantly angulated/displaced comminuted fracture involving the right femoral neck. No extension into the trochanters femoral head.    SOFT TISSUES:  -No significant hematoma identified.      Impression    IMPRESSION:  1.  Significant angulated/displaced comminuted fracture involving the right femoral neck.    2.  No significant hematoma identified.     XR Pelvis and Hip Portable Right 1 View    Narrative    EXAM: XR PELVIS AND HIP PORTABLE RIGHT 1 VIEW  LOCATION: [facility]  DATE: 5/10/2025    INDICATION: Status post Hip surgery  COMPARISON: CT and radiographs 5/9/2025       Impression    IMPRESSION: Cemented right hip hemiarthroplasty. Negative for postoperative purposes.        Discharge Medications   Discharge Medication List as of 5/13/2025 10:24 AM        START taking these medications    Details   acetaminophen (TYLENOL) 325 MG tablet Take 2 tablets (650 mg) by mouth every 4 hours as needed for other (mild pain)., Disp-100 tablet, R-0, E-Prescribe      aspirin 81 MG EC tablet Take 1 tablet (81 mg) by mouth 2 times daily., Disp-60 tablet, R-0, E-Prescribe      cefadroxil (DURICEF) 500 MG capsule Take 1 capsule (500 mg) by mouth 2 times daily for 3 days., Disp-6 capsule, R-0, E-Prescribe      polyethylene glycol (MIRALAX) 17 g packet Take 17 g by mouth daily., Disp-7 packet, R-0, E-Prescribe      senna-docusate (SENOKOT-S/PERICOLACE) 8.6-50 MG tablet Take 1-2 tablets by mouth 2 times daily. Take while on oral narcotics to prevent or treat constipation., Disp-30 tablet, R-0, E-PrescribeWhile taking narcotics           CONTINUE these medications which have CHANGED    Details    oxyCODONE (ROXICODONE) 5 MG tablet Take 0.5 tablets (2.5 mg) by mouth every 4 hours as needed for severe pain., Disp-10 tablet, R-0, Local Print           CONTINUE these medications which have NOT CHANGED    Details   levothyroxine (SYNTHROID/LEVOTHROID) 137 MCG tablet Take 137 mcg by mouth every morning (before breakfast)., Historical           Allergies   Allergies   Allergen Reactions    Sulfamethoxazole-Trimethoprim Anaphylaxis     Could be a reaction with the Cipro also.      Could be a reaction with the Cipro also.    Gluten Meal GI Disturbance     Celiac patient    Oxybutynin Swelling and Other (See Comments)     Comment: numbness around mouth, Description:    Sulfamethoxazole Other (See Comments)     Comment:  , Description:    Tolterodine Other (See Comments)     Blurred vision    Trimethoprim Other (See Comments)     Comment:  , Description:    Ciprofloxacin Itching, Other (See Comments) and Rash     Comment:  , Description:    Soap Rash

## 2025-05-13 NOTE — PROGRESS NOTES
Care Management Discharge Note    Discharge Date: 05/13/2025       Discharge Disposition: Transitional Care- [facility]     Discharge Services:      Discharge DME:      Discharge Transportation:  [employer] wheelchair 1:30pm    Private pay costs discussed: transportation costs    Does the patient's insurance plan have a 3 day qualifying hospital stay waiver?  No    PAS Confirmation Code:  [id number])  Patient/family educated on Medicare website which has current facility and service quality ratings:      Education Provided on the Discharge Plan: Yes  Persons Notified of Discharge Plans: patient's sister Adina   Patient/Family in Agreement with the Plan:      Handoff Referral Completed: No, handoff not indicated or clinically appropriate    Additional Information:  Chart reviewed.  Patient medically ready to discharge today with bed available at [facility].   ISABEL spoke to patient's sister Adina to provide update on discharge and Adina is accepting of bed. She is requesting wheelchair transport and is aware of the potential costs for transport.  Orders sent to facility.    left for Adina with ride time information.   Facility updated on ride time.     Flora Solomon, JACESW

## 2025-05-13 NOTE — PROGRESS NOTES
"  Orthopedic Progress Note      Assessment: 3 Days Post-Op  S/P Procedure(s):  HEMIARTHROPLASTY, RIGHT HIP, BIPOLAR     Plan:   Pain Control: Continue per multimodal pain protocol. Minimize opioid pain medications as able.  Weight Bearing: Weight bearing as tolerated  Precautions: None  DVT Prophylaxis: Risk stratified DVT prophylaxis. Aspirin 81 mg BID x 4 weeks. Early ambulation and SCDs while in bed.  Antibiotics: 24 hours of perioperative IV antibiotics and 7 days PO antibiotics  Diet: Advance diet as tolerated from orthopedic perspective  GI: Plan for aggressive bowel regimen to prevent constipation from opioid medications  Lines: HLIV once tolerating PO  PT/OT: Eval and treatment. Will follow up on recommendations.  Follow up:  Please follow-up with Dr. Israel's team in 2 weeks at [facility]. Call our scheduling line at [phone] to make an appointment, if you do not already have one scheduled.   Discharge plan: Pending TCU placement.  Orthopedically stable today      Subjective:  Patient is doing well on POD3. She is minimally verbal at baseline.  She is able to report that she does not have much pain, denies fever/chills today. She does have some tenderness to palpation/swelling over her lateral hip/thigh. We applied an ice pack to see if this helps with some of her discomfort.       Objective:  /60 (BP Location: Right arm)   Pulse 68   Temp 97.9  F (36.6  C) (Oral)   Resp 26   Ht 1.702 m (5' 7\")   Wt 90.7 kg (200 lb)   SpO2 95%   BMI 31.32 kg/m      The patient is A&Ox3. Appears comfortable, sitting up at bedside.  Calves without tenderness, neg Delio's  Brisk capillary refill in the toes.   Palpable Right dorsalis pedis pulse. Right foot warm & well-perfused.  Sensation is intact to light touch & equal bilaterally in the femoral, DP, SP & tibial nerve distributions.  ROM: Appropriately flexes & extends all toes bilaterally.   Motor: +5/5 dorsiflexion, plantar flexion & EHL " bilaterally.   Leg lengths equal.  Dressing C/D/I without strikethrough, no surrounding erythema.      No drain     Pertinent Labs   Lab Results: personally reviewed.   Lab Results   Component Value Date    INR 1.12 05/10/2025    INR 1.16 (H) 05/10/2025    PROTIME 14.7 05/10/2025    PROTIME 15.1 (H) 05/10/2025     Lab Results   Component Value Date    WBC 8.6 05/12/2025    HGB 9.9 (L) 05/12/2025    HCT 30.9 (L) 05/12/2025    MCV 88 05/12/2025     05/12/2025     Lab Results   Component Value Date     05/12/2025    CO2 25 05/12/2025         Report completed by:  Alexandra Bourgeois PA-C  Date: 05/13/2025  [facility]

## 2025-05-13 NOTE — PLAN OF CARE
Problem: Adult Inpatient Plan of Care  Goal: Plan of Care Review  Description: The Plan of Care Review/Shift note should be completed every shift.  The Outcome Evaluation is a brief statement about your assessment that the patient is improving, declining, or no change.  This information will be displayed automatically on your shiftnote.  Outcome: Met     Problem: Adult Inpatient Plan of Care  Goal: Absence of Hospital-Acquired Illness or Injury  Outcome: Met     Problem: Adult Inpatient Plan of Care  Goal: Absence of Hospital-Acquired Illness or Injury  Intervention: Identify and Manage Fall Risk  Recent Flowsheet Documentation  Taken 5/13/2025 1300 by Porter Unger, RN  Safety Promotion/Fall Prevention:   activity supervised   assistive device/personal items within reach   Goal Outcome Evaluation:  Patient alert to self, confused, meds crushed in pudding, right hip dressing CDI, pain only with movement, will discharge to a TCU today.

## 2025-05-13 NOTE — PLAN OF CARE
Physical Therapy Discharge Summary    Reason for therapy discharge:    Discharged to transitional care facility.    Progress towards therapy goal(s). See goals on Care Plan in [facility] electronic health record for goal details.  Goals partially met.  Barriers to achieving goals:   discharge from facility.    Therapy recommendation(s):    Continued therapy is recommended.  Rationale/Recommendations:  recommend continued PT at TCU.    Keerthi Pelaez, PT  5/13/2025

## 2025-05-13 NOTE — PLAN OF CARE
Problem: Adult Inpatient Plan of Care  Goal: Plan of Care Review  Outcome: Progressing  Flowsheets (Taken 5/12/2025 2225)  Plan of Care Reviewed With: patient  Overall Patient Progress: improving  Goal: Patient-Specific Goal (Individualized)  Outcome: Progressing  Goal: Absence of Hospital-Acquired Illness or Injury  Outcome: Progressing  Intervention: Identify and Manage Fall Risk  Recent Flowsheet Documentation  Taken 5/12/2025 1635 by Adegun, Oluwadamilola, RN  Safety Promotion/Fall Prevention:   activity supervised   assistive device/personal items within reach   nonskid shoes/slippers when out of bed  Intervention: Prevent Skin Injury  Recent Flowsheet Documentation  Taken 5/12/2025 1840 by Adegun, Oluwadamilola, RN  Body Position: sitting up in bed  Taken 5/12/2025 1635 by Adegun, Oluwadamilola, RN  Body Position:   turned   right  Intervention: Prevent and Manage VTE (Venous Thromboembolism) Risk  Recent Flowsheet Documentation  Taken 5/12/2025 1635 by Adegun, Oluwadamilola, RN  VTE Prevention/Management: SCDs on (sequential compression devices)  Goal: Optimal Comfort and Wellbeing  Outcome: Progressing  Intervention: Provide Person-Centered Care  Recent Flowsheet Documentation  Taken 5/12/2025 1635 by Adegun, Oluwadamilola, RN  Trust Relationship/Rapport: care explained  Goal: Readiness for Transition of Care  Outcome: Progressing     Problem: Delirium  Goal: Optimal Coping  Outcome: Progressing  Goal: Improved Behavioral Control  Outcome: Progressing  Intervention: Minimize Safety Risk  Recent Flowsheet Documentation  Taken 5/12/2025 1635 by Adegun, Oluwadamilola, RN  Enhanced Safety Measures: pain management  Trust Relationship/Rapport: care explained  Goal: Improved Attention and Thought Clarity  Outcome: Progressing  Goal: Improved Sleep  Outcome: Progressing     Problem: Fall Injury Risk  Goal: Absence of Fall and Fall-Related Injury  Outcome: Progressing  Intervention: Promote Injury-Free  Environment  Recent Flowsheet Documentation  Taken 5/12/2025 1635 by Adegun, Oluwadamilola, RN  Safety Promotion/Fall Prevention:   activity supervised   assistive device/personal items within reach   nonskid shoes/slippers when out of bed     Problem: Pain Acute  Goal: Optimal Pain Control and Function  Outcome: Progressing     Problem: Orthopaedic Fracture  Goal: Absence of Bleeding  Outcome: Progressing  Goal: Bowel Elimination  Outcome: Progressing  Goal: Absence of Embolism Signs and Symptoms  Outcome: Progressing  Intervention: Prevent or Manage Embolism Risk  Recent Flowsheet Documentation  Taken 5/12/2025 1635 by Adegun, Oluwadamilola, RN  VTE Prevention/Management: SCDs on (sequential compression devices)  Goal: Fracture Stability  Outcome: Progressing  Goal: Optimal Functional Ability  Outcome: Progressing  Intervention: Optimize Functional Ability  Recent Flowsheet Documentation  Taken 5/12/2025 1840 by Adegun, Oluwadamilola, RN  Positioning/Transfer Devices:   pillows   in use  Taken 5/12/2025 1635 by Adegun, Oluwadamilola, RN  Activity Management:   activity adjusted per tolerance   back to bed  Goal: Absence of Infection Signs and Symptoms  Outcome: Progressing  Goal: Effective Tissue Perfusion  Outcome: Progressing  Goal: Optimal Pain Control and Function  Outcome: Progressing  Goal: Effective Oxygenation and Ventilation  Outcome: Progressing  Intervention: Promote Airway Secretion Clearance  Recent Flowsheet Documentation  Taken 5/12/2025 1635 by Adegun, Oluwadamilola, RN  Activity Management:   activity adjusted per tolerance   back to bed  Intervention: Optimize Oxygenation and Ventilation  Recent Flowsheet Documentation  Taken 5/12/2025 1840 by Adegun, Oluwadamilola, RN  Head of Bed (HOB) Positioning: HOB at 30-45 degrees  Taken 5/12/2025 1635 by Adegun, Oluwadamilola, RN  Head of Bed (HOB) Positioning: HOB at 20-30 degrees     Problem: Confusion Acute  Goal: Optimal Cognitive Function  Outcome:  Progressing     Problem: Skin Injury Risk Increased  Goal: Skin Health and Integrity  Outcome: Progressing  Intervention: Plan: Nurse Driven Intervention: Moisture Management  Recent Flowsheet Documentation  Taken 5/12/2025 1635 by Adegun, Oluwadamilola, RN  Moisture Interventions:   Incontinence pad   Urinary collection device  Intervention: Plan: Nurse Driven Intervention: Friction and Shear  Recent Flowsheet Documentation  Taken 5/12/2025 1635 by Adegun, Oluwadamilola, RN  Friction/Shear Interventions: HOB 30 degrees or less  Intervention: Optimize Skin Protection  Recent Flowsheet Documentation  Taken 5/12/2025 1840 by Adegun, Oluwadamilola, RN  Head of Bed (HOB) Positioning: HOB at 30-45 degrees  Taken 5/12/2025 1635 by Adegun, Oluwadamilola, RN  Activity Management:   activity adjusted per tolerance   back to bed  Head of Bed (HOB) Positioning: HOB at 20-30 degrees     Problem: Mobility Impairment  Goal: Optimal Mobility  Outcome: Progressing  Intervention: Optimize Mobility  Recent Flowsheet Documentation  Taken 5/12/2025 1840 by Adegun, Oluwadamilola, RN  Positioning/Transfer Devices:   pillows   in use  Taken 5/12/2025 1635 by Adegun, Oluwadamilola, RN  Activity Management:   activity adjusted per tolerance   back to bed     Problem: UTI (Urinary Tract Infection)  Goal: Improved Infection Symptoms  Outcome: Progressing     Problem: Hip Fracture Surgical Repair  Goal: Optimal Coping with Change in Health Status  Outcome: Progressing  Goal: Absence of Bleeding  Outcome: Progressing  Goal: Effective Bowel Elimination  Outcome: Progressing  Goal: Cognitive Function Maintained  Outcome: Progressing  Goal: Fluid and Electrolyte Balance  Outcome: Progressing  Goal: Absence of Infection Signs and Symptoms  Outcome: Progressing  Goal: Optimal Functional Ability  Outcome: Progressing  Intervention: Promote Optimal Functional Status  Recent Flowsheet Documentation  Taken 5/12/2025 1635 by Adegun, Oluwadamilola,  RN  Activity Management:   activity adjusted per tolerance   back to bed  Goal: Anesthesia/Sedation Recovery  Outcome: Progressing  Intervention: Optimize Anesthesia Recovery  Recent Flowsheet Documentation  Taken 5/12/2025 1635 by Adegun, Oluwadamilola, RN  Safety Promotion/Fall Prevention:   activity supervised   assistive device/personal items within reach   nonskid shoes/slippers when out of bed  Goal: Optimal Pain Control and Function  Outcome: Progressing  Goal: Nausea and Vomiting Relief  Outcome: Progressing  Goal: Effective Urinary Elimination  Outcome: Progressing  Goal: Effective Oxygenation and Ventilation  Outcome: Progressing  Intervention: Optimize Oxygenation and Ventilation  Recent Flowsheet Documentation  Taken 5/12/2025 1840 by Adegun, Oluwadamilola, RN  Head of Bed (HOB) Positioning: HOB at 30-45 degrees  Taken 5/12/2025 1635 by Adegun, Oluwadamilola, RN  Head of Bed (HOB) Positioning: HOB at 20-30 degrees   Goal Outcome Evaluation: Pt alert to self, minimally responsive verbally. VSS, pain managed with scheduled tylenol. Dressing on R hip clean, dry and intact. Purewick in place, voiding adequately.       Plan of Care Reviewed With: patient    Overall Patient Progress: improvingOverall Patient Progress: improving

## 2025-05-14 ENCOUNTER — DOCUMENTATION ONLY (OUTPATIENT)
Dept: GERIATRICS | Facility: CLINIC | Age: 80
End: 2025-05-14
Payer: COMMERCIAL

## 2025-05-14 ENCOUNTER — LAB REQUISITION (OUTPATIENT)
Dept: LAB | Facility: CLINIC | Age: 80
End: 2025-05-14
Payer: COMMERCIAL

## 2025-05-14 ENCOUNTER — PATIENT OUTREACH (OUTPATIENT)
Dept: CARE COORDINATION | Facility: CLINIC | Age: 80
End: 2025-05-14
Payer: COMMERCIAL

## 2025-05-14 ENCOUNTER — TRANSITIONAL CARE UNIT VISIT (OUTPATIENT)
Dept: GERIATRICS | Facility: CLINIC | Age: 80
End: 2025-05-14
Payer: COMMERCIAL

## 2025-05-14 VITALS
OXYGEN SATURATION: 95 % | DIASTOLIC BLOOD PRESSURE: 67 MMHG | SYSTOLIC BLOOD PRESSURE: 134 MMHG | HEART RATE: 70 BPM | WEIGHT: 213.5 LBS | RESPIRATION RATE: 16 BRPM | HEIGHT: 67 IN | BODY MASS INDEX: 33.51 KG/M2 | TEMPERATURE: 97.5 F

## 2025-05-14 DIAGNOSIS — K59.01 SLOW TRANSIT CONSTIPATION: ICD-10-CM

## 2025-05-14 DIAGNOSIS — S72.001S CLOSED RIGHT HIP FRACTURE, SEQUELA: Primary | ICD-10-CM

## 2025-05-14 DIAGNOSIS — R53.1 WEAKNESS: ICD-10-CM

## 2025-05-14 DIAGNOSIS — F03.C0 SEVERE DEMENTIA WITHOUT BEHAVIORAL DISTURBANCE, PSYCHOTIC DISTURBANCE, MOOD DISTURBANCE, OR ANXIETY, UNSPECIFIED DEMENTIA TYPE (H): ICD-10-CM

## 2025-05-14 PROBLEM — M75.42 IMPINGEMENT SYNDROME OF LEFT SHOULDER: Status: ACTIVE | Noted: 2022-08-25

## 2025-05-14 PROBLEM — E66.01 MORBID (SEVERE) OBESITY DUE TO EXCESS CALORIES (H): Status: RESOLVED | Noted: 2024-01-10 | Resolved: 2025-05-14

## 2025-05-14 PROBLEM — K90.0 CELIAC DISEASE: Status: ACTIVE | Noted: 2022-07-07

## 2025-05-14 PROCEDURE — 99309 SBSQ NF CARE MODERATE MDM 30: CPT | Performed by: NURSE PRACTITIONER

## 2025-05-14 NOTE — LETTER
5/14/2025      Ophelia Spangler  [address] [address] [address]        [facility]    PRIMARY CARE PROVIDER AND CLINIC:  Gianfranco Doyle PA-C, [facility] [address]  / [facility]  Chief Complaint   Patient presents with     Hospital F/U      [place] Medical Record Number:  [id number]  Place of Service where encounter took place:  [facility] (U) [[id number]]    Ophelia Spangler  is a 80 year old  (1945), admitted to the above facility from  [facility]. Hospital stay 5/9/25 through 5/13/25. Patient presented from her memory care unit after a fall, admitted with right femoral neck fracture. She underwent hemiarthroplasty.     HPI obtained from patient visit, review of nursing home record, discussion with facility staff, and Epic review.     HPI:    Provider introduces self and role, reviews hospital stay and typical TCU course. Patient is unable to participate in HPI due to dementia, unable to answer questions. She was able to follow 1-step commands with therapy today. She is currently max to dependent for transfers.    CODE STATUS/ADVANCE DIRECTIVES DISCUSSION:  No CPR- Pre-arrest intubation OK    ALLERGIES:   Allergies   Allergen Reactions     Sulfamethoxazole-Trimethoprim Anaphylaxis     Could be a reaction with the Cipro also.      Could be a reaction with the Cipro also.     Gluten Meal GI Disturbance     Celiac patient     Oxybutynin Swelling and Other (See Comments)     Comment: numbness around mouth, Description:     Sulfamethoxazole Other (See Comments)     Comment:  , Description:     Tolterodine Other (See Comments)     Blurred vision     Trimethoprim Other (See Comments)     Comment:  , Description:     Ciprofloxacin Itching, Other (See Comments) and Rash     Comment:  , Description:     Soap Rash      PAST MEDICAL HISTORY: No past medical history on file.   PAST SURGICAL HISTORY:   has a past surgical history  "that includes Open reduction internal fixation hip bipolar (Right, 5/10/2025).  FAMILY HISTORY: family history is not on file.  SOCIAL HISTORY:   reports that she has never smoked. She has never used smokeless tobacco.  Patient's living condition: lives in an assisted living facility    Post Discharge Medication Reconciliation Status:   MED REC REQUIRED  Post Medication Reconciliation Status:  Discharge medications reconciled, continue medications without change       Current Outpatient Medications   Medication Sig Dispense Refill     acetaminophen (TYLENOL) 325 MG tablet Take 2 tablets (650 mg) by mouth every 4 hours as needed for other (mild pain). 100 tablet 0     aspirin 81 MG EC tablet Take 1 tablet (81 mg) by mouth 2 times daily. 60 tablet 0     levothyroxine (SYNTHROID/LEVOTHROID) 137 MCG tablet Take 137 mcg by mouth every morning (before breakfast).       oxyCODONE (ROXICODONE) 5 MG tablet Take 0.5 tablets (2.5 mg) by mouth every 4 hours as needed for severe pain. 10 tablet 0     polyethylene glycol (MIRALAX) 17 g packet Take 17 g by mouth daily. 7 packet 0     senna-docusate (SENOKOT-S/PERICOLACE) 8.6-50 MG tablet Take 1-2 tablets by mouth 2 times daily. Take while on oral narcotics to prevent or treat constipation. 30 tablet 0     No current facility-administered medications for this visit.       ROS:  Unobtainable secondary to cognitive impairment.     Vitals:  /67   Pulse 70   Temp 97.5  F (36.4  C)   Resp 16   Ht 1.702 m (5' 7\")   Wt 96.8 kg (213 lb 8 oz)   SpO2 95%   BMI 33.44 kg/m    Exam:  GENERAL APPEARANCE:  Alert, in no distress  EYES:  EOM normal, conjunctiva and lids normal  RESP:  no respiratory distress  CV:  no edema  PSYCH:  insight and judgement impaired, memory impaired     Lab/Diagnostic data:  Recent labs in [email or web address] reviewed by me today.     ASSESSMENT/PLAN:  (P62.001S) Closed right hip fracture, sequela  (primary encounter diagnosis)  Comment: s/p hemiarthroplasty. No s/sx " DVT. No s/sx poor wound healing. Goal is to discharge home when PT/OT goals met. This will be more complicated due to dementia. She likely needs to be assist of 1 in order to return to [facility]  Plan: PT/OT eval and treat, discharge planning per their recommendation. Continue ASA, monitor s/sx DVT. Continue oxycodone prn for pain, use sparingly, monitor pain severity. Monitor incision. F/u ortho as scheduled.    (F03.C0) Severe dementia without behavioral disturbance, psychotic disturbance, mood disturbance, or anxiety, unspecified dementia type (H)  Comment: Chronic, progressive. HPI/ROS difficult to obtain with cognitive impairment.  Plan: Continue 24 hour care. Monitor functional status. Monitor for behavioral disturbances.    (K59.01) Slow transit constipation  Comment: Unable to assess status as she just arrived yesterday  Plan: Continue Miralax and Senna-S as ordered. Monitor bowel function. Adjust medication as clinically indicated.        Electronically signed by:  SAMUEL William CNP                     Sincerely,        SAMUEL William CNP    Electronically signed

## 2025-05-14 NOTE — PROGRESS NOTES
[facility]    Background: Transitional Care Management program identified per system criteria and reviewed by Connected Care Resource Center team for possible outreach.    Assessment: Upon chart review, CCRC Team member will not proceed with patient outreach related to this episode of Transitional Care Management program due to reason below:    Non-[facility] TCU: CCRC team member noted patient discharged to TCU/ARU/LTACH. Patient is not established with a [facility] currently supported by Primary Care-Care Coordination therefore handoff to Primary Care-Care Coordination is not appropriate at this time.    Plan: Transitional Care Management episode addressed appropriately per reason noted above.      Celi Reece MA  Connected Care Resource [employer], [facility]    *Connected Care Resource Team does NOT follow patient ongoing. Referrals are identified based on internal discharge reports and the outreach is to ensure patient has an understanding of their discharge instructions.

## 2025-05-14 NOTE — PROGRESS NOTES
[facility] GERIATR[facility]    PRIMARY CARE PROVIDER AND CLINIC:  Gianfranco Doyle PA-C, [facility] [address]  / [facility]  Chief Complaint   Patient presents with    Hospital F/U      [place] Medical Record Number:  [id number]  Place of Service where encounter took place:  [facility] (TCU) [[id number]]    Ophelia Spangler  is a 80 year old  (1945), admitted to the above facility from  [facility]. Hospital stay 5/9/25 through 5/13/25. Patient presented from her memory care unit after a fall, admitted with right femoral neck fracture. She underwent hemiarthroplasty.     HPI obtained from patient visit, review of nursing home record, discussion with facility staff, and Epic review.     HPI:    Provider introduces self and role, reviews hospital stay and typical TCU course. Patient is unable to participate in HPI due to dementia, unable to answer questions. She was able to follow 1-step commands with therapy today. She is currently max to dependent for transfers.    CODE STATUS/ADVANCE DIRECTIVES DISCUSSION:  No CPR- Pre-arrest intubation OK    ALLERGIES:   Allergies   Allergen Reactions    Sulfamethoxazole-Trimethoprim Anaphylaxis     Could be a reaction with the Cipro also.      Could be a reaction with the Cipro also.    Gluten Meal GI Disturbance     Celiac patient    Oxybutynin Swelling and Other (See Comments)     Comment: numbness around mouth, Description:    Sulfamethoxazole Other (See Comments)     Comment:  , Description:    Tolterodine Other (See Comments)     Blurred vision    Trimethoprim Other (See Comments)     Comment:  , Description:    Ciprofloxacin Itching, Other (See Comments) and Rash     Comment:  , Description:    Soap Rash      PAST MEDICAL HISTORY: No past medical history on file.   PAST SURGICAL HISTORY:   has a past surgical history that includes Open reduction internal fixation hip bipolar (Right, 5/10/2025).  FAMILY  "HISTORY: family history is not on file.  SOCIAL HISTORY:   reports that she has never smoked. She has never used smokeless tobacco.  Patient's living condition: lives in an assisted living facility    Post Discharge Medication Reconciliation Status:   MED REC REQUIRED  Post Medication Reconciliation Status:  Discharge medications reconciled, continue medications without change       Current Outpatient Medications   Medication Sig Dispense Refill    acetaminophen (TYLENOL) 325 MG tablet Take 2 tablets (650 mg) by mouth every 4 hours as needed for other (mild pain). 100 tablet 0    aspirin 81 MG EC tablet Take 1 tablet (81 mg) by mouth 2 times daily. 60 tablet 0    levothyroxine (SYNTHROID/LEVOTHROID) 137 MCG tablet Take 137 mcg by mouth every morning (before breakfast).      oxyCODONE (ROXICODONE) 5 MG tablet Take 0.5 tablets (2.5 mg) by mouth every 4 hours as needed for severe pain. 10 tablet 0    polyethylene glycol (MIRALAX) 17 g packet Take 17 g by mouth daily. 7 packet 0    senna-docusate (SENOKOT-S/PERICOLACE) 8.6-50 MG tablet Take 1-2 tablets by mouth 2 times daily. Take while on oral narcotics to prevent or treat constipation. 30 tablet 0     No current facility-administered medications for this visit.       ROS:  Unobtainable secondary to cognitive impairment.     Vitals:  /67   Pulse 70   Temp 97.5  F (36.4  C)   Resp 16   Ht 1.702 m (5' 7\")   Wt 96.8 kg (213 lb 8 oz)   SpO2 95%   BMI 33.44 kg/m    Exam:  GENERAL APPEARANCE:  Alert, in no distress  EYES:  EOM normal, conjunctiva and lids normal  RESP:  no respiratory distress  CV:  no edema  PSYCH:  insight and judgement impaired, memory impaired     Lab/Diagnostic data:  Recent labs in [employer] reviewed by me today.     ASSESSMENT/PLAN:  (S72.001S) Closed right hip fracture, sequela  (primary encounter diagnosis)  Comment: s/p hemiarthroplasty. No s/sx DVT. No s/sx poor wound healing. Goal is to discharge home when PT/OT goals met. This will be " more complicated due to dementia. She likely needs to be assist of 1 in order to return to [facility]  Plan: PT/OT eval and treat, discharge planning per their recommendation. Continue ASA, monitor s/sx DVT. Continue oxycodone prn for pain, use sparingly, monitor pain severity. Monitor incision. F/u ortho as scheduled.    (F03.C0) Severe dementia without behavioral disturbance, psychotic disturbance, mood disturbance, or anxiety, unspecified dementia type (H)  Comment: Chronic, progressive. HPI/ROS difficult to obtain with cognitive impairment.  Plan: Continue 24 hour care. Monitor functional status. Monitor for behavioral disturbances.    (K59.01) Slow transit constipation  Comment: Unable to assess status as she just arrived yesterday  Plan: Continue Miralax and Senna-S as ordered. Monitor bowel function. Adjust medication as clinically indicated.        Electronically signed by:  SAMUEL William CNP

## 2025-05-15 ENCOUNTER — RESULTS FOLLOW-UP (OUTPATIENT)
Dept: FAMILY MEDICINE | Facility: CLINIC | Age: 80
End: 2025-05-15

## 2025-05-16 ENCOUNTER — RESULTS FOLLOW-UP (OUTPATIENT)
Dept: GERIATRICS | Facility: CLINIC | Age: 80
End: 2025-05-16

## 2025-05-16 LAB
ANION GAP SERPL CALCULATED.3IONS-SCNC: 8 MMOL/L (ref 7–15)
BUN SERPL-MCNC: 13.1 MG/DL (ref 8–23)
CALCIUM SERPL-MCNC: 9.4 MG/DL (ref 8.8–10.4)
CHLORIDE SERPL-SCNC: 105 MMOL/L (ref 98–107)
CREAT SERPL-MCNC: 0.68 MG/DL (ref 0.51–0.95)
EGFRCR SERPLBLD CKD-EPI 2021: 88 ML/MIN/1.73M2
ERYTHROCYTE [DISTWIDTH] IN BLOOD BY AUTOMATED COUNT: 12.8 % (ref 10–15)
GLUCOSE SERPL-MCNC: 103 MG/DL (ref 70–99)
HCO3 SERPL-SCNC: 26 MMOL/L (ref 22–29)
HCT VFR BLD AUTO: 36 % (ref 35–47)
HGB BLD-MCNC: 11.1 G/DL (ref 11.7–15.7)
MCH RBC QN AUTO: 27.8 PG (ref 26.5–33)
MCHC RBC AUTO-ENTMCNC: 30.8 G/DL (ref 31.5–36.5)
MCV RBC AUTO: 90 FL (ref 78–100)
PLATELET # BLD AUTO: 332 10E3/UL (ref 150–450)
POTASSIUM SERPL-SCNC: 4.8 MMOL/L (ref 3.4–5.3)
RBC # BLD AUTO: 3.99 10E6/UL (ref 3.8–5.2)
SODIUM SERPL-SCNC: 139 MMOL/L (ref 135–145)
WBC # BLD AUTO: 9.3 10E3/UL (ref 4–11)

## 2025-05-16 PROCEDURE — 80048 BASIC METABOLIC PNL TOTAL CA: CPT | Mod: ORL | Performed by: INTERNAL MEDICINE

## 2025-05-16 PROCEDURE — 36415 COLL VENOUS BLD VENIPUNCTURE: CPT | Mod: ORL | Performed by: INTERNAL MEDICINE

## 2025-05-16 PROCEDURE — 85027 COMPLETE CBC AUTOMATED: CPT | Mod: ORL | Performed by: INTERNAL MEDICINE

## 2025-05-16 PROCEDURE — P9603 ONE-WAY ALLOW PRORATED MILES: HCPCS | Mod: ORL | Performed by: INTERNAL MEDICINE

## 2025-05-22 ENCOUNTER — TRANSITIONAL CARE UNIT VISIT (OUTPATIENT)
Dept: GERIATRICS | Facility: CLINIC | Age: 80
End: 2025-05-22
Payer: COMMERCIAL

## 2025-05-22 ENCOUNTER — LAB REQUISITION (OUTPATIENT)
Dept: LAB | Facility: CLINIC | Age: 80
End: 2025-05-22
Payer: COMMERCIAL

## 2025-05-22 VITALS
DIASTOLIC BLOOD PRESSURE: 55 MMHG | BODY MASS INDEX: 33.59 KG/M2 | HEIGHT: 67 IN | WEIGHT: 214 LBS | TEMPERATURE: 98.1 F | OXYGEN SATURATION: 95 % | SYSTOLIC BLOOD PRESSURE: 135 MMHG | RESPIRATION RATE: 18 BRPM | HEART RATE: 69 BPM

## 2025-05-22 DIAGNOSIS — D64.9 ANEMIA, UNSPECIFIED: ICD-10-CM

## 2025-05-22 NOTE — LETTER
2025      Ohpelia Spangler  [address] [facility] [address]        [facility] GERIATRIC SERVICE  Episodic/Acute/Follow-Up  [place] MRN: [id number]. Place of Service where encounter took place:  No question data found. No chief complaint on file.   HPI: Ophelia Spangler  is a 80 year old (1945), who is being seen today for an episodic care visit.    TCU Course:  : TCU Admission.(McConnell's. Fall, right hip fx, UTI).   : Admit visit.     Ophelia seen today on routine follow up as she continues to rehab in TCU.     Today, ***    Past Medical and Surgical History reviewed in Epic today.  MEDICATIONS:  Current Outpatient Medications   Medication Sig Dispense Refill    acetaminophen (TYLENOL) 325 MG tablet Take 2 tablets (650 mg) by mouth every 4 hours as needed for other (mild pain). 100 tablet 0    aspirin 81 MG EC tablet Take 1 tablet (81 mg) by mouth 2 times daily. 60 tablet 0    levothyroxine (SYNTHROID/LEVOTHROID) 137 MCG tablet Take 137 mcg by mouth every morning (before breakfast).      oxyCODONE (ROXICODONE) 5 MG tablet Take 0.5 tablets (2.5 mg) by mouth every 4 hours as needed for severe pain. 10 tablet 0    polyethylene glycol (MIRALAX) 17 g packet Take 17 g by mouth daily. 7 packet 0    senna-docusate (SENOKOT-S/PERICOLACE) 8.6-50 MG tablet Take 1-2 tablets by mouth 2 times daily. Take while on oral narcotics to prevent or treat constipation. 30 tablet 0     Objective: There were no vitals taken for this visit.  Exam:  GENERAL APPEARANCE: Alert, in no distress, cooperative.   RESP: Respiratory effort ***, no respiratory distress, Lung sounds clear.*** On RA. ***  CV: Auscultation of heart reveals S1, S2, rate*** and rhythm***, no*** murmur, no rub or gallop, Edema ***+ BLE. Peripheral pulses are 2+***.  PSYCH: Insight, judgement, and memory are ***, affect and mood are ***.    ASSESSMENT/PLAN:  {fgsdia}  Acute on chronic. ***  Provider reviewed records from  facility, and interpreted most recent imaging/lab work (***), and vital signs, each with their own characteristics requiring MDM.  Provider discussed care with nursing, , and rehab team ***:  ***  ***  ***  ***  ***  ***  ***  ***  Follow up w/in 1 week or as needed.***    Orders:  ***  Schedule Tylenol?  Reduce Oxy    Rm 105     Electronically signed by:  SAMUEL Madrid CNP DNP        Sincerely,        SAMUEL Coyle CNP    Electronically signed

## 2025-05-22 NOTE — PROGRESS NOTES
"[facility] GERIATRIC SERVICE  Episodic/Acute/Follow-Up  [place] MRN: [id number]. Place of Service where encounter took place:  [facility] ([facility]) [[id number]]   Chief Complaint   Patient presents with    RECHECK    HPI: Ophelia Spangler  is a 80 year old (1945), who is being seen today for an episodic care visit.    TCU Course:  : TCU Admission.(Roxie's. Fall, right hip fx, UTI).   : Admit visit.     Ophelia seen today on routine follow up as she continues to rehab in TCU.     Today, ***    Past Medical and Surgical History reviewed in Epic today.  MEDICATIONS:  Current Outpatient Medications   Medication Sig Dispense Refill    acetaminophen (TYLENOL) 325 MG tablet Take 2 tablets (650 mg) by mouth every 4 hours as needed for other (mild pain). 100 tablet 0    aspirin 81 MG EC tablet Take 1 tablet (81 mg) by mouth 2 times daily. 60 tablet 0    levothyroxine (SYNTHROID/LEVOTHROID) 137 MCG tablet Take 137 mcg by mouth every morning (before breakfast).      oxyCODONE (ROXICODONE) 5 MG tablet Take 0.5 tablets (2.5 mg) by mouth every 4 hours as needed for severe pain. 10 tablet 0    polyethylene glycol (MIRALAX) 17 g packet Take 17 g by mouth daily. 7 packet 0    senna-docusate (SENOKOT-S/PERICOLACE) 8.6-50 MG tablet Take 1-2 tablets by mouth 2 times daily. Take while on oral narcotics to prevent or treat constipation. 30 tablet 0     Objective: /55   Pulse 69   Temp 98.1  F (36.7  C)   Resp 18   Ht 1.702 m (5' 7\")   Wt 97.1 kg (214 lb)   SpO2 95%   BMI 33.52 kg/m    Exam:  GENERAL APPEARANCE: Alert, in no distress, cooperative.   RESP: Respiratory effort ***, no respiratory distress, Lung sounds clear.*** On RA. ***  CV: Auscultation of heart reveals S1, S2, rate*** and rhythm***, no*** murmur, no rub or gallop, Edema ***+ BLE. Peripheral pulses are 2+***.  PSYCH: Insight, judgement, and memory are ***, affect and mood are ***.    ASSESSMENT/PLAN:  {fgsdia}  Acute on " chronic. ***  Provider reviewed records from facility, and interpreted most recent imaging/lab work (CBC, BMP), and vital signs, each with their own characteristics requiring MDM.  Provider discussed care with nursing, , and rehab team:  ***Rehab: Eval done, trying to co-treat because cog. MaxA2 for transfers. Stands for 3 minutes. Max ULBD.  Slums attempted, could not complete due to poor cognition.  ***SW: Care conf today. [facility]. Can go back at  level.   ***  ***  ***  ***  ***  ***  Follow up w/in 1 week or as needed.    Orders:  Tylenol 1000mg PO TID. Dx: pain.  Change PRN Tylenol to 650mg PO Qday PRN. Dx: pain/fever.  Decrease Oxycodone to 2.5mg PO TID PRN. Dx: severe pain.  Recheck Hgb x1 on 5/27. Dx: anemia.     Electronically signed by:  Dr. Melonie Brock, APRN CNP DNP

## 2025-05-27 ENCOUNTER — TRANSITIONAL CARE UNIT VISIT (OUTPATIENT)
Dept: GERIATRICS | Facility: CLINIC | Age: 80
End: 2025-05-27
Payer: COMMERCIAL

## 2025-05-27 VITALS
BODY MASS INDEX: 32.55 KG/M2 | HEART RATE: 82 BPM | DIASTOLIC BLOOD PRESSURE: 60 MMHG | OXYGEN SATURATION: 94 % | TEMPERATURE: 98.1 F | HEIGHT: 67 IN | RESPIRATION RATE: 18 BRPM | SYSTOLIC BLOOD PRESSURE: 120 MMHG | WEIGHT: 207.4 LBS

## 2025-05-27 DIAGNOSIS — S72.001S CLOSED RIGHT HIP FRACTURE, SEQUELA: Primary | ICD-10-CM

## 2025-05-27 DIAGNOSIS — F03.C0 SEVERE DEMENTIA WITHOUT BEHAVIORAL DISTURBANCE, PSYCHOTIC DISTURBANCE, MOOD DISTURBANCE, OR ANXIETY, UNSPECIFIED DEMENTIA TYPE (H): ICD-10-CM

## 2025-05-27 LAB
HGB BLD-MCNC: 12.2 G/DL (ref 11.7–15.7)
MCV RBC AUTO: 89 FL (ref 78–100)

## 2025-05-27 PROCEDURE — 99309 SBSQ NF CARE MODERATE MDM 30: CPT | Performed by: NURSE PRACTITIONER

## 2025-05-27 NOTE — PROGRESS NOTES
"[facility] GERIATRICS    Chief Complaint   Patient presents with    RECHECK     HPI:  Ophelia Spangler is a 80 year old  (1945), who is being seen today for an episodic care visit at: [facility] ([facility]) [[id number]].     Today's concern is:   Patient is unable to provide HPI due to dementia. She is smiling. She claps her hands when provider mentions that her sister requested a phone call. Social work advised provider that sister Adina wanted a phone call ASAP. In speaking with her, she was upset that no one from here has called her at all. Provider apologized and updated her that Ophelia is stable, recovering well, and that there have not been any medical concerns. She asks about her progress with therapy and provider reads the most recent notes, that she walked 5 feet in the parallel bars. Noted that she does have an appointment scheduled with ortho for 5/30. This would be challenging for her to attend. Will reach out to ortho provider that can come on site for follow up. Adina repeats the concerns several times, provider gives education on a typical [facility] course and again reassures her that Ophelia is doing well.    Allergies, and PMH/PSH reviewed in EPIC today.  REVIEW OF SYSTEMS:  Unobtainable secondary to cognitive impairment.     Objective:   /60   Pulse 82   Temp 98.1  F (36.7  C)   Resp 18   Ht 1.702 m (5' 7\")   Wt 94.1 kg (207 lb 6.4 oz)   SpO2 94%   BMI 32.48 kg/m    GENERAL APPEARANCE:  Smiling, sitting up in wheelchair, no apparent distress  RESP:  no respiratory distress  CV:  no edema  PSYCH:  calm, smiling, minimal verbal response      Assessment/Plan:  (X62.001S) Closed right hip fracture, sequela  (primary encounter diagnosis)  Comment: Recovering as expected, even better than expected as her dementia could result in emotional distress, delirium falls, but this has not been an issue. It would be very challenging for her to go to an ortho appointment due to her dementia. " She cannot participate in the visit and would not tolerate xrays or exam well  Plan: Continue current POC with no changes at this time and adjustments as needed. Message sent to ortho NP. Will call Adina back with anticipated date of follow up    (F03.C0) Severe dementia without behavioral disturbance, psychotic disturbance, mood disturbance, or anxiety, unspecified dementia type (H)  Comment: Patient surprisingly has not had any issues with emotional distress or falls. She is able to participate with therapy. She was not using an assistive device at baseline, so it may be challenging to get her to use a walker at her memory care. Hopefully she can stay at TCU until she is much more mobil        Electronically signed by: SAMUEL William CNP

## 2025-05-27 NOTE — LETTER
" 5/27/2025      Ophelia Spangler  [address] [address] [address]        [facility] GERIATRICS    Chief Complaint   Patient presents with     RECHECK     HPI:  Ophelia Spangler is a 80 year old  (1945), who is being seen today for an episodic care visit at: [facility] ([facility]) [[id number]].     Today's concern is:   Patient is unable to provide HPI due to dementia. She is smiling. She claps her hands when provider mentions that her sister requested a phone call. Social work advised provider that sister Adina wanted a phone call ASAP. In speaking with her, she was upset that no one from here has called her at all. Provider apologized and updated her that Ophelia is stable, recovering well, and that there have not been any medical concerns. She asks about her progress with therapy and provider reads the most recent notes, that she walked 5 feet in the parallel bars. Noted that she does have an appointment scheduled with ortho for 5/30. This would be challenging for her to attend. Will reach out to ortho provider that can come on site for follow up. Adina repeats the concerns several times, provider gives education on a typical [facility] course and again reassures her that Ophelia is doing well.    Allergies, and PMH/PSH reviewed in EPIC today.  REVIEW OF SYSTEMS:  Unobtainable secondary to cognitive impairment.     Objective:   /60   Pulse 82   Temp 98.1  F (36.7  C)   Resp 18   Ht 1.702 m (5' 7\")   Wt 94.1 kg (207 lb 6.4 oz)   SpO2 94%   BMI 32.48 kg/m    GENERAL APPEARANCE:  Smiling, sitting up in wheelchair, no apparent distress  RESP:  no respiratory distress  CV:  no edema  PSYCH:  calm, smiling, minimal verbal response      Assessment/Plan:  (S72.001S) Closed right hip fracture, sequela  (primary encounter diagnosis)  Comment: Recovering as expected, even better than expected as her dementia could result in emotional distress, delirium falls, but this has not been an issue. It " would be very challenging for her to go to an ortho appointment due to her dementia. She cannot participate in the visit and would not tolerate xrays or exam well  Plan: Continue current POC with no changes at this time and adjustments as needed. Message sent to ortho NP. Will call Adina back with anticipated date of follow up    (F03.C0) Severe dementia without behavioral disturbance, psychotic disturbance, mood disturbance, or anxiety, unspecified dementia type (H)  Comment: Patient surprisingly has not had any issues with emotional distress or falls. She is able to participate with therapy. She was not using an assistive device at baseline, so it may be challenging to get her to use a walker at her memory care. Hopefully she can stay at TCU until she is much more mobil        Electronically signed by: SAMUEL William CNP           Sincerely,        SAMUEL William CNP    Electronically signed

## 2025-05-29 ENCOUNTER — TRANSITIONAL CARE UNIT VISIT (OUTPATIENT)
Dept: GERIATRICS | Facility: CLINIC | Age: 80
End: 2025-05-29
Payer: COMMERCIAL

## 2025-05-29 VITALS
RESPIRATION RATE: 18 BRPM | OXYGEN SATURATION: 94 % | HEART RATE: 56 BPM | TEMPERATURE: 97.9 F | HEIGHT: 67 IN | WEIGHT: 207.4 LBS | DIASTOLIC BLOOD PRESSURE: 49 MMHG | BODY MASS INDEX: 32.55 KG/M2 | SYSTOLIC BLOOD PRESSURE: 106 MMHG

## 2025-05-29 DIAGNOSIS — S72.001S CLOSED RIGHT HIP FRACTURE, SEQUELA: Primary | ICD-10-CM

## 2025-05-29 NOTE — LETTER
5/29/2025      Ophelia Spangler  [address] [address] [address]        No notes on file      Sincerely,        Janine Douglas, APRN CNP    Electronically signed   starting to make progress.  Limited by her dementia, does not seem to be having pain issues per staff report.  No discharge date has been discussed.  X-rays look good.  Incision without s/s infection.  Reviewed all of this with patient's sister, Adina.  Adina notes that future ortho follow-ups will be difficult [place]  We discussed that if Ophelia continues to progress, does not have signs of pain or change in mobility, likely could just follow-up as needed but will confirm with Dr. Israel at [place].  Plan:   -Continue WBAT to RLE with assistive device  -ASA 81mg BID x 4 weeks  -Continue acetaminophen TID for pain  -OK to leave hip incision open to air and get wet  -Do not scrub, soak, or submerge incision until >6 weeks postop  -Follow-up (if able) with Dr. Israel at [place] in 4 weeks.  Otherwise, PRN for pain, change in mobility, lack of progress      Electronically signed by: SAMUEL Duggan CNP         Sincerely,        SAMUEL Duggan CNP    Electronically signed

## 2025-06-02 ENCOUNTER — TRANSITIONAL CARE UNIT VISIT (OUTPATIENT)
Dept: GERIATRICS | Facility: CLINIC | Age: 80
End: 2025-06-02
Payer: COMMERCIAL

## 2025-06-02 VITALS
WEIGHT: 209.2 LBS | BODY MASS INDEX: 32.83 KG/M2 | OXYGEN SATURATION: 92 % | TEMPERATURE: 97.7 F | DIASTOLIC BLOOD PRESSURE: 59 MMHG | HEART RATE: 55 BPM | SYSTOLIC BLOOD PRESSURE: 116 MMHG | RESPIRATION RATE: 18 BRPM | HEIGHT: 67 IN

## 2025-06-02 DIAGNOSIS — S72.001S CLOSED RIGHT HIP FRACTURE, SEQUELA: Primary | ICD-10-CM

## 2025-06-02 DIAGNOSIS — F03.C0 SEVERE DEMENTIA WITHOUT BEHAVIORAL DISTURBANCE, PSYCHOTIC DISTURBANCE, MOOD DISTURBANCE, OR ANXIETY, UNSPECIFIED DEMENTIA TYPE (H): ICD-10-CM

## 2025-06-02 PROCEDURE — 99308 SBSQ NF CARE LOW MDM 20: CPT | Performed by: NURSE PRACTITIONER

## 2025-06-02 NOTE — LETTER
" 6/2/2025      Ophelia Spangler  [address] [address] [facility] [address] [address]        [facility] GERIATRICS    Chief Complaint   Patient presents with     RECHECK     HPI:  Ophelia Spangler is a 80 year old  (1945), who is being seen today for an episodic care visit at: [facility] ([facility]) [[id number]].     Today's concern is:   Staff report that patient refuses to take her medications. She can get agitated with them. She is participating in therapy. On 5/30 she walked 35 feet with a walker. She is smiling at provider, but does not respond verbally    Allergies, and PMH/PSH reviewed in [employer] today.  REVIEW OF SYSTEMS:  Unobtainable secondary to cognitive impairment.     Objective:   /59   Pulse 55   Temp 97.7  F (36.5  C)   Resp 18   Ht 1.702 m (5' 7\")   Wt 94.9 kg (209 lb 3.2 oz)   SpO2 92%   BMI 32.77 kg/m    GENERAL APPEARANCE:  Alert, in no distress  RESP:  no respiratory distress  PSYCH:  no verbal response, smiling, calm      Assessment/Plan:  (S72.001S) Closed right hip fracture, sequela  (primary encounter diagnosis)  (F03.C0) Severe dementia without behavioral disturbance, psychotic disturbance, mood disturbance, or anxiety, unspecified dementia type (H)  Comment: Patient seems to be progressing fine despite refusing acetaminophen. Will stop medications other than ASA and levothyroxine. It is possible she will take them if there are less. At home she appears to be on only levothyroxine. She is likely put off by the larger number of pills. She is at risk for DVT, hopefully she will take the ASA. Goal is to discharge back to [facility] when PT/OT goals met.  Plan: PT/OT eval and treat, discharge planning per their recommendation. Continue ASA, monitor s/sx DVT. Discontinue acetaminophen, oxycodone, senna, miralax. Monitor incision. F/u ortho as scheduled.      Electronically signed by: SAMUEL William CNP           Sincerely,        SAMUEL William CNP    Electronically " signed

## 2025-06-02 NOTE — PROGRESS NOTES
"[facility] GERIATRICS    Chief Complaint   Patient presents with    RECHECK     HPI:  Ophelia Spangler is a 80 year old  (1945), who is being seen today for an episodic care visit at: [facility] ([facility]) [[id number]].     Today's concern is:   Staff report that patient refuses to take her medications. She can get agitated with them. She is participating in therapy. On 5/30 she walked 35 feet with a walker. She is smiling at provider, but does not respond verbally    Allergies, and PMH/PSH reviewed in [employer] today.  REVIEW OF SYSTEMS:  Unobtainable secondary to cognitive impairment.     Objective:   /59   Pulse 55   Temp 97.7  F (36.5  C)   Resp 18   Ht 1.702 m (5' 7\")   Wt 94.9 kg (209 lb 3.2 oz)   SpO2 92%   BMI 32.77 kg/m    GENERAL APPEARANCE:  Alert, in no distress  RESP:  no respiratory distress  PSYCH:  no verbal response, smiling, calm      Assessment/Plan:  (S72.001S) Closed right hip fracture, sequela  (primary encounter diagnosis)  (F03.C0) Severe dementia without behavioral disturbance, psychotic disturbance, mood disturbance, or anxiety, unspecified dementia type (H)  Comment: Patient seems to be progressing fine despite refusing acetaminophen. Will stop medications other than ASA and levothyroxine. It is possible she will take them if there are less. At home she appears to be on only levothyroxine. She is likely put off by the larger number of pills. She is at risk for DVT, hopefully she will take the ASA. Goal is to discharge back to [facility] when PT/OT goals met.  Plan: PT/OT eval and treat, discharge planning per their recommendation. Continue ASA, monitor s/sx DVT. Discontinue acetaminophen, oxycodone, senna, miralax. Monitor incision. F/u ortho as scheduled.      Electronically signed by: SAMUEL William CNP       "

## 2025-06-04 NOTE — PROGRESS NOTES
"[facility] GERIATRICS    Chief Complaint   Patient presents with    RECHECK     Ortho     HPI:  Ophelia Spangler is a 80 year old  (1945), who is being seen today for an episodic care visit at: [facility] ([facility]) [[id number]].     Today's Visit:  Post-op visit  Surgery: R hip hemiarthroplasty  DOS: 5/10/25  Surgeon: Dr. Melecio Israel  Current WB status: WBAT RLE  Anticoagulation: ASA 81mg BID x 4 weeks  Pain management: acetaminophen 1000mg TID    Ophelia is seen resting in bed in TCU.  She is nonverbal, advanced dementia.  Does not display signs of pain with assessment and movement of the RLE.  Smiling, pleasant.  Nursing deny concerns.  Spoke with Ophelia's sister (Adina) who reports Ophelia has started to participate in therapies more, walking a few steps in parallel bars.  Adina has no concerns beyond hearing about x-ray results.      Allergies, and PMH/PSH reviewed in EPIC today.  REVIEW OF SYSTEMS:  As noted in HPI    Objective:   /49   Pulse 56   Temp 97.9  F (36.6  C)   Resp 18   Ht 1.702 m (5' 7\")   Wt 94.1 kg (207 lb 6.4 oz)   SpO2 94%   BMI 32.48 kg/m    GENERAL APPEARANCE:  Alert, in no distress, cooperative  M/S:   R hip:  incision well-approximated with dermabond.  No drainage, erythema.  No ecchymosis.  Unable to follow commands for SLR.  Pedal pulse 2+.    PSYCH:  insight and judgement impaired, memory impaired , nonverbal- unable to assess orientation    X-rays reviewed from [facility]:  5/28 3 views pelvis + R hip  Reviewed by me personally.  Demonstrates intact cemented R hip hemiarthroplasty without evidence of dislocation, hardware failure or migration.  Good anatomic alignment.    Assessment/Plan:  (S72.001S) Closed right hip fracture, sequela  (primary encounter diagnosis)  Sequela.  Ophelia is now >2 weeks postop R hip hemiarthropalsty for hip fracture.  Per staff and family report, she is starting to make progress.  Limited by her dementia, does not seem to be having pain " issues per staff report.  No discharge date has been discussed.  X-rays look good.  Incision without s/s infection.  Reviewed all of this with patient's sister, Adina.  Adina notes that future ortho follow-ups will be difficult [place]  We discussed that if Ophelia continues to progress, does not have signs of pain or change in mobility, likely could just follow-up as needed but will confirm with Dr. Israel at [place].  Plan:   -Continue WBAT to RLE with assistive device  -ASA 81mg BID x 4 weeks  -Continue acetaminophen TID for pain  -OK to leave hip incision open to air and get wet  -Do not scrub, soak, or submerge incision until >6 weeks postop  -Follow-up (if able) with Dr. Israel at [place] in 4 weeks.  Otherwise, PRN for pain, change in mobility, lack of progress      Electronically signed by: SAMUEL Duggan CNP

## 2025-06-06 ENCOUNTER — TRANSITIONAL CARE UNIT VISIT (OUTPATIENT)
Dept: GERIATRICS | Facility: CLINIC | Age: 80
End: 2025-06-06
Payer: COMMERCIAL

## 2025-06-06 VITALS
SYSTOLIC BLOOD PRESSURE: 114 MMHG | BODY MASS INDEX: 33.34 KG/M2 | TEMPERATURE: 97.7 F | WEIGHT: 212.9 LBS | DIASTOLIC BLOOD PRESSURE: 63 MMHG | RESPIRATION RATE: 16 BRPM | OXYGEN SATURATION: 99 % | HEART RATE: 61 BPM

## 2025-06-06 DIAGNOSIS — S72.001S CLOSED RIGHT HIP FRACTURE, SEQUELA: Primary | ICD-10-CM

## 2025-06-06 DIAGNOSIS — S72.001A HIP FRACTURE, RIGHT, CLOSED, INITIAL ENCOUNTER (H): ICD-10-CM

## 2025-06-06 DIAGNOSIS — E03.9 HYPOTHYROIDISM, UNSPECIFIED TYPE: ICD-10-CM

## 2025-06-06 DIAGNOSIS — F03.C0 SEVERE DEMENTIA WITHOUT BEHAVIORAL DISTURBANCE, PSYCHOTIC DISTURBANCE, MOOD DISTURBANCE, OR ANXIETY, UNSPECIFIED DEMENTIA TYPE (H): ICD-10-CM

## 2025-06-06 PROCEDURE — 99309 SBSQ NF CARE MODERATE MDM 30: CPT | Performed by: NURSE PRACTITIONER

## 2025-06-06 NOTE — LETTER
6/6/2025      Ophelia Spangler  [address] [address] [address]        [facility] GERIATRICS    Chief Complaint   Patient presents with     RECHECK     HPI:  Ophelia Spangler is a 80 year old  (1945), who is being seen today for an episodic care visit at: [facility] ([facility]) [[id number]]. Patient admitted to the above facility from  [facility]. Hospital stay 5/9/25 through 5/13/25. Patient presented from her memory care unit after a fall, admitted with right femoral neck fracture. She underwent hemiarthroplasty.     Today's concern is:   Patient's medications were reduced due to her refusing to take them. She is still only sporadically taking the ASA and levothyroxine. Her sister is updated with this, she was aware. Provider advised her that she is being monitored for s/sx DVT due to refusing ASA. She is participating in therapy, walked 75 feet today with a walker. PT notes erratic velocity.     Allergies, and PMH/PSH reviewed in [employer] today.  REVIEW OF SYSTEMS:  Unobtainable secondary to cognitive impairment.     Objective:   /63   Pulse 61   Temp 97.7  F (36.5  C)   Resp 16   Wt 96.6 kg (212 lb 14.4 oz)   SpO2 99%   BMI 33.34 kg/m    GENERAL APPEARANCE:  Alert, in no distress  EYES:  EOM normal, conjunctiva and lids normal  RESP:  no respiratory distress  M/S:   ACE wraps on, has symmetrical swelling in both feet, no s/sx pain when R calf palpated, leg is soft  PSYCH:  calm, limited verbal response, said yes just once      Assessment/Plan:  (S72.001S) Closed right hip fracture, sequela  (primary encounter diagnosis)  Comment: Progressing with therapy. No s/sx uncontrolled pain. No s/sx DVT. ASA is scheduled to be done 6/10/25. Plan is to maximize progress in therapy before discharging back to [facility].   Plan: PT/OT eval and treat, discharge planning per their recommendation. Discontinue ASA as planned, monitor s/sx DVT. Monitor incision. F/u ortho as  scheduled.    (F03.C0) Severe dementia without behavioral disturbance, psychotic disturbance, mood disturbance, or anxiety, unspecified dementia type (H)  Comment: Chronic, progressive. Requires 24 hour supervision. HPI/ROS difficult to obtain with cognitive impairment. Expect further functional and cognitive decline.   Plan: Continue 24 hour care. Monitor for behavioral disturbances.    (E03.9) Hypothyroidism, unspecified type  Comment: Advised Adina that checking her TSH is not going to be of benefit. It is difficulty to adjust dosing if she refuses many doses. Giving her too much would be more detrimental. Recommend continuing same dose and encouraging her to take it      Electronically signed by: SAMUEL William CNP           Sincerely,        SAMUEL William CNP    Electronically signed

## 2025-06-06 NOTE — PROGRESS NOTES
[facility] GERIATRICS    Chief Complaint   Patient presents with    RECHECK     HPI:  Ophelia Spangler is a 80 year old  (1945), who is being seen today for an episodic care visit at: [facility] (U) [[id number]]. Patient admitted to the above facility from  [facility]. Hospital stay 5/9/25 through 5/13/25. Patient presented from her memory care unit after a fall, admitted with right femoral neck fracture. She underwent hemiarthroplasty.     Today's concern is:   Patient's medications were reduced due to her refusing to take them. She is still only sporadically taking the ASA and levothyroxine. Her sister is updated with this, she was aware. Provider advised her that she is being monitored for s/sx DVT due to refusing ASA. She is participating in therapy, walked 75 feet today with a walker. PT notes erratic velocity.     Allergies, and PMH/PSH reviewed in EPIC today.  REVIEW OF SYSTEMS:  Unobtainable secondary to cognitive impairment.     Objective:   /63   Pulse 61   Temp 97.7  F (36.5  C)   Resp 16   Wt 96.6 kg (212 lb 14.4 oz)   SpO2 99%   BMI 33.34 kg/m    GENERAL APPEARANCE:  Alert, in no distress  EYES:  EOM normal, conjunctiva and lids normal  RESP:  no respiratory distress  M/S:   ACE wraps on, has symmetrical swelling in both feet, no s/sx pain when R calf palpated, leg is soft  PSYCH:  calm, limited verbal response, said yes just once      Assessment/Plan:  (S72.001S) Closed right hip fracture, sequela  (primary encounter diagnosis)  Comment: Progressing with therapy. No s/sx uncontrolled pain. No s/sx DVT. ASA is scheduled to be done 6/10/25. Plan is to maximize progress in therapy before discharging back to [facility].   Plan: PT/OT eval and treat, discharge planning per their recommendation. Discontinue ASA as planned, monitor s/sx DVT. Monitor incision. F/u ortho as scheduled.    (F03.C0) Severe dementia without behavioral disturbance, psychotic  disturbance, mood disturbance, or anxiety, unspecified dementia type (H)  Comment: Chronic, progressive. Requires 24 hour supervision. HPI/ROS difficult to obtain with cognitive impairment. Expect further functional and cognitive decline.   Plan: Continue 24 hour care. Monitor for behavioral disturbances.    (E03.9) Hypothyroidism, unspecified type  Comment: Advised Adina that checking her TSH is not going to be of benefit. It is difficulty to adjust dosing if she refuses many doses. Giving her too much would be more detrimental. Recommend continuing same dose and encouraging her to take it      Electronically signed by: SAMUEL William CNP

## 2025-06-09 RX ORDER — ASPIRIN 81 MG/1
81 TABLET ORAL 2 TIMES DAILY
Status: SHIPPED
Start: 2025-06-09 | End: 2025-06-12

## 2025-06-12 ENCOUNTER — TRANSITIONAL CARE UNIT VISIT (OUTPATIENT)
Dept: GERIATRICS | Facility: CLINIC | Age: 80
End: 2025-06-12
Payer: COMMERCIAL

## 2025-06-12 VITALS
DIASTOLIC BLOOD PRESSURE: 63 MMHG | WEIGHT: 211.8 LBS | OXYGEN SATURATION: 97 % | HEIGHT: 67 IN | SYSTOLIC BLOOD PRESSURE: 126 MMHG | BODY MASS INDEX: 33.24 KG/M2 | RESPIRATION RATE: 18 BRPM | TEMPERATURE: 97.7 F | HEART RATE: 89 BPM

## 2025-06-12 NOTE — PROGRESS NOTES
[facility] GERIATRICS  INITIAL VISIT NOTE  June 12, 2025      PRIMARY CARE PROVIDER AND CLINIC:  Gianfranco Doyle [facility] PHYSIC[facility]ANS [address] / [facility] 5*    [facility] Medical Record Number:  [id number]  Place of Service where encounter took place:  [facility] ([facility]) [[id number]]    Chief Complaint   Patient presents with    Hospital F/U       HPI:    Ophelia Spangler is a 80 year old  (1945) female seen today at *** Medical history is notable for ***    *** admitted to this facility for  {FGS ADMISSION REASONS:216569}.      History obtained from: {FGS HPI:860223}.      Today, Ms. Spangler is seen in her room along with one of the nursing assistance.  She is a limited historian due to advanced dementia (BIMS 6/15).  Nursing assistant reports no acute concerns this morning.  She was able to get out of bed on her own with some encouragement.  No acute concerns today per discussion with nursing.  She is working with therapies.    CODE STATUS: {CODE STATUS:538501}    ALLERGIES:  Allergies   Allergen Reactions    Sulfamethoxazole-Trimethoprim Anaphylaxis     Could be a reaction with the Cipro also.      Could be a reaction with the Cipro also.    Gluten Meal GI Disturbance     Celiac patient    Oxybutynin Other (See Comments) and Swelling     Comment: numbness around mouth, Description:    Other Reaction(s): Swelling, lips/tongue    Sulfamethoxazole Other (See Comments)     Comment:  , Description:    Tolterodine Other (See Comments)     Blurred vision    Trimethoprim Other (See Comments)     Comment:  , Description:    Ciprofloxacin Itching, Other (See Comments) and Rash     Comment:  , Description:    Soap Rash       PAST MEDICAL HISTORY:   ***    PAST SURGICAL HISTORY:   Past Surgical History:   Procedure Laterality Date    OPEN REDUCTION INTERNAL FIXATION HIP BIPOLAR Right 5/10/2025    Procedure: HEMIARTHROPLASTY, RIGHT HIP, BIPOLAR;  Surgeon: Melecio Israel MD;   "Location: [facility] Main OR       SOCIAL HISTORY:   Patient's living condition: {LIVES WITH (NURSING HOME):306308}    MEDICATIONS:  Post Discharge Medication Reconciliation Status: discharge medications reconciled and changed, per note/orders.  Current Outpatient Medications   Medication Sig Dispense Refill    levothyroxine (SYNTHROID/LEVOTHROID) 137 MCG tablet Take 137 mcg by mouth every morning (before breakfast).         ROS:  Unable to obtain due to cognitive impairment or aphasia    PHYSICAL EXAM:  /63   Pulse 89   Temp 97.7  F (36.5  C)   Resp 18   Ht 1.702 m (5' 7\")   Wt 96.1 kg (211 lb 12.8 oz)   SpO2 97%   BMI 33.17 kg/m    Gen: sitting in wheelchair, alert, cooperative and in no acute distress  Resp: breathing non labored, no tachypnea  Ext: no LE edema  Neuro: CX II-XII grossly in tact; ROM in all four extremities grossly in tact  Psych: memory, judgement and insight impaired    LABORATORY/IMAGING DATA:  [facility]    ASSESSMENT/PLAN:    Left Femoral Neck Fracture s/p Bipolar Hemiarthroplasty (***)  Secondary to fall.*Without complication.  --PT/OT  --Follow-up with Ortho as scheduled    ***Anemia    Hypothyroidism  TSH ***  --Levothyroxine 137 mcg daily    Physical Deconditioning  In setting of hospitalization and underlying medical conditions  -- ongoing PT/OT      Electronically signed by:  Amber Martin MD                      "

## 2025-06-12 NOTE — LETTER
6/12/2025      Ophelia Spangler  [address] [address] [address]        No notes on file      Sincerely,        Amber Martin MD    Electronically signed

## 2025-06-18 ENCOUNTER — TRANSITIONAL CARE UNIT VISIT (OUTPATIENT)
Dept: GERIATRICS | Facility: CLINIC | Age: 80
End: 2025-06-18
Payer: COMMERCIAL

## 2025-06-18 VITALS
BODY MASS INDEX: 32.02 KG/M2 | TEMPERATURE: 97.9 F | OXYGEN SATURATION: 97 % | SYSTOLIC BLOOD PRESSURE: 119 MMHG | DIASTOLIC BLOOD PRESSURE: 56 MMHG | RESPIRATION RATE: 16 BRPM | HEIGHT: 67 IN | HEART RATE: 59 BPM | WEIGHT: 204 LBS

## 2025-06-18 DIAGNOSIS — S72.001S CLOSED RIGHT HIP FRACTURE, SEQUELA: Primary | ICD-10-CM

## 2025-06-18 DIAGNOSIS — F03.C0 SEVERE DEMENTIA WITHOUT BEHAVIORAL DISTURBANCE, PSYCHOTIC DISTURBANCE, MOOD DISTURBANCE, OR ANXIETY, UNSPECIFIED DEMENTIA TYPE (H): ICD-10-CM

## 2025-06-18 PROCEDURE — 99308 SBSQ NF CARE LOW MDM 20: CPT | Performed by: NURSE PRACTITIONER

## 2025-06-18 NOTE — PROGRESS NOTES
"[facility] GERIATRICS    Chief Complaint   Patient presents with    RECHECK     HPI:  Ophelia Spangler is a 80 year old  (1945), who is being seen today for an episodic care visit at: [facility] ([facility]) [[id number]].  Patient admitted to the above facility from  [facility]. Hospital stay 5/9/25 through 5/13/25. Patient presented from her memory care unit after a fall, admitted with right femoral neck fracture. She underwent hemiarthroplasty.     Today's concern is:   Patient unable to provide HPI due to dementia. She smiles at provider. Therapy can be challenging with her dementia and ability to follow directions, but she did walk 165 feet today with a walker. The challenge was getting her to stand up, she could not from the wheelchair, they had to take her to the parallel bars.     Allergies, and PMH/PSH reviewed in EPIC today.  REVIEW OF SYSTEMS:  Unobtainable secondary to cognitive impairment.     Objective:   /56   Pulse 59   Temp 97.9  F (36.6  C)   Resp 16   Ht 1.702 m (5' 7\")   Wt 92.5 kg (204 lb)   SpO2 97%   BMI 31.95 kg/m    GENERAL APPEARANCE:  Alert, in no distress  RESP:  no respiratory distress  PSYCH:  calm, smiling      Assessment/Plan:  (S72.001S) Closed right hip fracture, sequela  (primary encounter diagnosis)  (F03.C0) Severe dementia without behavioral disturbance, psychotic disturbance, mood disturbance, or anxiety, unspecified dementia type (H)  Comment: Patient is making progress. No s/sx complications. She needs to be an assist of 1 to return to her memory care unit. She may end up requiring a wheelchair at discharge for safety.   Plan: PT/OT eval and treat, discharge planning per their recommendations.      Electronically signed by: SAMUEL William CNP       "

## 2025-06-18 NOTE — LETTER
" 6/18/2025      Ophelia Spangler  [address] [address] [address]        [facility] GERIATRICS    Chief Complaint   Patient presents with     RECHECK     HPI:  Ophelia Spangler is a 80 year old  (1945), who is being seen today for an episodic care visit at: [facility] ([facility]) [[id number]].  Patient admitted to the above facility from  [facility]. Hospital stay 5/9/25 through 5/13/25. Patient presented from her memory care unit after a fall, admitted with right femoral neck fracture. She underwent hemiarthroplasty.     Today's concern is:   Patient unable to provide HPI due to dementia. She smiles at provider. Therapy can be challenging with her dementia and ability to follow directions, but she did walk 165 feet today with a walker. The challenge was getting her to stand up, she could not from the wheelchair, they had to take her to the parallel bars.     Allergies, and PMH/PSH reviewed in EPIC today.  REVIEW OF SYSTEMS:  Unobtainable secondary to cognitive impairment.     Objective:   /56   Pulse 59   Temp 97.9  F (36.6  C)   Resp 16   Ht 1.702 m (5' 7\")   Wt 92.5 kg (204 lb)   SpO2 97%   BMI 31.95 kg/m    GENERAL APPEARANCE:  Alert, in no distress  RESP:  no respiratory distress  PSYCH:  calm, smiling      Assessment/Plan:  (S72.001S) Closed right hip fracture, sequela  (primary encounter diagnosis)  (F03.C0) Severe dementia without behavioral disturbance, psychotic disturbance, mood disturbance, or anxiety, unspecified dementia type (H)  Comment: Patient is making progress. No s/sx complications. She needs to be an assist of 1 to return to her memory care unit. She may end up requiring a wheelchair at discharge for safety.   Plan: PT/OT eval and treat, discharge planning per their recommendations.      Electronically signed by: SAMUEL William CNP           Sincerely,        SAMUEL William CNP    Electronically signed  "

## 2025-06-25 ENCOUNTER — TELEPHONE (OUTPATIENT)
Dept: FAMILY MEDICINE | Facility: CLINIC | Age: 80
End: 2025-06-25
Payer: COMMERCIAL

## 2025-06-25 NOTE — LETTER
June 25, 2025    Ophelia Spangler    [address] [address] [place] [address]    Hello,     Your care team at [facility] values your health and well-being. After reviewing your chart, we have identified recommendation(s) to help you better manage your health.    It's time for your Medicare AWV. During your visit, we'll discuss your health, well-being, and any questions you may have related to preventive care. We'll also review any recommended tests, exams, or screenings you might need. To schedule please call see phone number above (only if physical letter) or use your [employer] account.    If you recently had or are having any of these services soon, please contact the clinic via phone or [employer] so that your care team can update your records.    We look forward to seeing you at your upcoming visit.    If you have any questions or concerns, please contact our clinic. Thank you for continuing to trust us with your care.    Sincerely,    Your [facility] Care Team            Electronically signed

## 2025-06-25 NOTE — TELEPHONE ENCOUNTER
Patient Quality Outreach    Patient is due for the following:   Physical Annual Wellness Visit      Topic Date Due    COVID-19 Vaccine (4 - 2024-25 season) 09/01/2024       Action(s) Taken:   Schedule a Annual Wellness Visit    Type of outreach:    Sent letter.    Questions for provider review:             Luis Perez MA  Chart routed to .

## 2025-06-26 ENCOUNTER — TELEPHONE (OUTPATIENT)
Dept: GERIATRICS | Facility: CLINIC | Age: 80
End: 2025-06-26
Payer: COMMERCIAL

## 2025-06-26 NOTE — TELEPHONE ENCOUNTER
[facility] Triage Nurse Telephone Encounter    Provider: SAMUEL Gu CNP   Facility: [facility] Facility Type:  TCU      Allergies:    Allergies   Allergen Reactions    Sulfamethoxazole-Trimethoprim Anaphylaxis     Could be a reaction with the Cipro also.      Could be a reaction with the Cipro also.    Gluten Meal GI Disturbance     Celiac patient    Oxybutynin Other (See Comments) and Swelling     Comment: numbness around mouth, Description:    Other Reaction(s): Swelling, lips/tongue    Sulfamethoxazole Other (See Comments)     Comment:  , Description:    Tolterodine Other (See Comments)     Blurred vision    Trimethoprim Other (See Comments)     Comment:  , Description:    Ciprofloxacin Itching, Other (See Comments) and Rash     Comment:  , Description:    Soap Rash        Reason for call: Pt had a fall today and is reporting increase in pain in her right hip.     CHRIS provided: 2 view xry to right hip/pelvic. Tylenol 1000 mg TID PRN    Provider giving Order:  SAMUEL Gu CNP     Verbal Order given to: Pan Collazo

## 2025-07-01 ENCOUNTER — DISCHARGE SUMMARY NURSING HOME (OUTPATIENT)
Dept: GERIATRICS | Facility: CLINIC | Age: 80
End: 2025-07-01
Payer: COMMERCIAL

## 2025-07-01 ENCOUNTER — DOCUMENTATION ONLY (OUTPATIENT)
Dept: GERIATRICS | Facility: CLINIC | Age: 80
End: 2025-07-01

## 2025-07-01 VITALS
SYSTOLIC BLOOD PRESSURE: 119 MMHG | HEART RATE: 61 BPM | HEIGHT: 67 IN | TEMPERATURE: 97.9 F | OXYGEN SATURATION: 94 % | WEIGHT: 207.5 LBS | BODY MASS INDEX: 32.57 KG/M2 | RESPIRATION RATE: 18 BRPM | DIASTOLIC BLOOD PRESSURE: 50 MMHG

## 2025-07-01 DIAGNOSIS — E03.9 HYPOTHYROIDISM, UNSPECIFIED TYPE: ICD-10-CM

## 2025-07-01 DIAGNOSIS — F03.C0 SEVERE DEMENTIA WITHOUT BEHAVIORAL DISTURBANCE, PSYCHOTIC DISTURBANCE, MOOD DISTURBANCE, OR ANXIETY, UNSPECIFIED DEMENTIA TYPE (H): ICD-10-CM

## 2025-07-01 DIAGNOSIS — S72.001S CLOSED RIGHT HIP FRACTURE, SEQUELA: Primary | ICD-10-CM

## 2025-07-01 PROCEDURE — 99316 NF DSCHRG MGMT 30 MIN+: CPT | Performed by: NURSE PRACTITIONER

## 2025-07-01 NOTE — PROGRESS NOTES
[facility] GERIATRICS  Face to Face and Medical Necessity Statement for DME Provider visit    Patient: Ophelia Spangler  Gender: female  YOB: 1945  [place] Medical Record Number: [id number]  Demographics:  [address] [address] [address]  [phone] (home) [phone] (work)  Social Security Number: [id number]  Primary Care Provider: Gianfranco Doyle  Insurance: Payor: BLUE PLUS / Plan: BLUE PLUS ADVANTAGE DUAL MSHO / Product Type: Medicare /     HPI: Ophelia Spangler is a 80 year old (1945), who is being seen today for a face to face provider visit at [facility]. Medical necessity statement for DME included.     This patient requires the following: DME Ordered and Medical Necessity Statement   Manual wheelchair  Size: 18 x 18     The patient has mobility limitation that significantly impairs their ability to participate in one or more mobility related activities (MRADLs): Toileting, Feeding, Grooming and Bathing due to (S72.001S) Closed right hip fracture, sequela and (F03.C0) Severe dementia without behavioral disturbance, psychotic disturbance, mood disturbance, or anxiety, unspecified dementia type. The patient's mobility limitations cannot be safely resolved by using a cane/walker. Patient and/or caregiver is able to safely use the manual wheelchair. Patient's functional mobility deficit can be sufficiently resolved by the use of a manual wheelchair. Patient's home provides adequate access between rooms, maneuvering space, and surfaces for use of a manual wheelchair. Use of a manual wheelchair will significantly improve the patient's ability to participate in MRADLs. The patient will use it on a regular basis in the home. The patient has not expressed unwillingness to use a manual wheelchair in the home.     Pt needing above DME with expected length of need of  99 months due to medical necessity associated with following diagnosis:    "  Closed right hip fracture, sequela  Severe dementia without behavioral disturbance, psychotic disturbance, mood disturbance, or anxiety, unspecified dementia type (H)      Past Medical History:   has no past medical history on file.    Review of Systems:  Unobtainable secondary to cognitive impairment.     Exam:  /45   Pulse 61   Temp 98.1  F (36.7  C)   Resp 18   Ht 1.702 m (5' 7\")   Wt 93.2 kg (205 lb 8 oz)   SpO2 98%   BMI 32.19 kg/m    Constitutional: healthy, alert, and no distress   Cardiovascular: negative, PMI normal. No lifts, heaves, or thrills. RRR. No murmurs, clicks gallops or rub  Respiratory: negative, Percussion normal. Good diaphragmatic excursion. Lungs clear  Psychiatric: nonverbal, calm  JOINT/EXTREMITIES: gait slow, shuffling, in parallel bars     Assessment/Plan:  (S72.001S) Closed right hip fracture, sequela  (primary encounter diagnosis)  (F03.C0) Severe dementia without behavioral disturbance, psychotic disturbance, mood disturbance, or anxiety, unspecified dementia type (H)  Comment: Due to hip fracture and dementia, patient has not been able to recover back to previous functional status. She requires a wheelchair for mobility and safety. She remains high fall risk        Orders:  1. Facility staff/TC to contact DME company to get their order form for provider to fill out    ELECTRONICALLY SIGNED BY ADEEL CERTIFIED PROVIDER: SAMUEL William CNP   NPI: [id number]  [facility]IATR[facility] [address] [place] [address]    "

## 2025-07-01 NOTE — LETTER
7/1/2025      Ophelia Spangler  [address] [address] [address]        [facility] GERIATRICS DISCHARGE SUMMARY  PATIENT'S NAME: Ophelia Spangler  YOB: 1945  MEDICAL RECORD NUMBER:  [id number]  Place of Service where encounter took place:  [facility] ([facility]) [[id number]]    PRIMARY CARE PROVIDER AND CLINIC RESPONSIBLE AFTER TRANSFER:   Gianfranco Doyle PA-C, [facility] PHYSICIANS [address] [place] [address] N / [facility]    Non-G Provider     Transferring providers: SAMUEL William CNP, Amber Martin MD  Recent Hospitalization/ED:  Hospital  [facility] stay 5/9/25 to 5/13/25.  Date of SNF Admission: May 13, 2025  Date of SNF (anticipated) Discharge: July 03, 2025  Discharged to: previous assisted living  DME: Wheelchair    CODE STATUS/ADVANCE DIRECTIVES DISCUSSION:  No CPR- Pre-arrest intubation OK   ALLERGIES: Sulfamethoxazole-trimethoprim, Gluten meal, Oxybutynin, Sulfamethoxazole, Tolterodine, Trimethoprim, Ciprofloxacin, and Soap    NURSING FACILITY COURSE   Medication Changes/Rationale:   Oxycodone stopped, not needed    Summary of nursing facility stay:   Ophelia Spangler  is a 80 year old  (1945), admitted to the above facility from  [facility]. Hospital stay 5/9/25 through 5/13/25. Patient presented from her memory care unit after a fall, admitted with right femoral neck fracture. She underwent hemiarthroplasty.     Closed right hip fracture, sequela  Patient with very slow progress in therapy. She has not been able to return to her prior level of functioning. She can transfer and walk a few steps, but requires a wheelchair for mobility.     Severe dementia without behavioral disturbance, psychotic disturbance, mood disturbance, or anxiety, unspecified dementia type (H)  Patient refused all medications for a time, but has been taking them now. She had a fall recently, no injury. She is nonverbal,  "wanders in the wheelchair at times, occasional attempts to self transfer    Hypothyroidism, unspecified type  Patient missed several doses of levothyroxine when refusing to take meds. Would not recheck TSH for a number of weeks      Discharge Medications:  Current Outpatient Medications   Medication Sig Dispense Refill     acetaminophen (TYLENOL) 500 MG tablet Take 1,000 mg by mouth 3 times daily as needed for mild pain.       levothyroxine (SYNTHROID/LEVOTHROID) 137 MCG tablet Take 137 mcg by mouth every morning (before breakfast).          Controlled medications:   not applicable/none     Past Medical History: No past medical history on file.  Physical Exam:   Vitals: /50   Pulse 61   Temp 97.9  F (36.6  C)   Resp 18   Ht 1.702 m (5' 7\")   Wt 94.1 kg (207 lb 8 oz)   SpO2 94%   BMI 32.50 kg/m    BMI: Body mass index is 32.5 kg/m .  GENERAL APPEARANCE:  Alert, in no distress  EYES:  EOM normal, conjunctiva and lids normal  RESP:  no respiratory distress  SKIN:  Inspection of skin and subcutaneous tissue baseline  PSYCH:  nonverbal, affect flat     SNF labs: Labs done in SNF are in [place] EPIC. Please refer to them using EPIC/Care Everywhere.    DISCHARGE PLAN:  Follow up labs: No labs orders/due  Medical Follow Up:      Follow up with primary care provider in 2-3 weeks  Discharge Services: Home Care:  Occupational Therapy and Physical Therapy      TOTAL DISCHARGE TIME:   Greater than 30 minutes  Electronically signed by:  SAMUEL William CNP       Documentation of Face to Face and Certification for Home Health Services    I certify that services are/were furnished while this patient was under the care of a physician and that a physician or an allowed non-physician practitioner (NPP), had a face-to-face encounter that meets the physician face-to-face encounter requirements. The encounter was in whole, or in part, related to the primary reason for home health. The patient is confined to his/her " home and needs intermittent skilled nursing, physical therapy, speech-language pathology, or the continued need for occupational therapy. A plan of care has been established by a physician and is periodically reviewed by a physician.  Date of Face-to-Face Encounter: 7/1/2025.    I certify that, based on my findings, the following services are medically necessary home health services: Occupational Therapy and Physical Therapy.    My clinical findings support the need for the above skilled services because: Requires assistance of another person or specialized equipment to access medical services because patient: Is prone to wander/get lost without assistance. and Is unable to operate assistive equipment on their own...    Patient to re-establish plan of care with their PCP within 7-10 days after leaving the facility to reestablish care.  Medicare certified PECOS provider: SAMUEL William CNP  Date: July 1, 2025                  Sincerely,        SAMUEL William CNP    Electronically signed

## 2025-07-01 NOTE — PROGRESS NOTES
[facility] GERIATRICS DISCHARGE SUMMARY  PATIENT'S NAME: Ophelia Spangler  YOB: 1945  MEDICAL RECORD NUMBER:  [id number]  Place of Service where encounter took place:  [facility] ([facility]) [[id number]]    PRIMARY CARE PROVIDER AND CLINIC RESPONSIBLE AFTER TRANSFER:   Gianfranco Doyle PA-C, [facility] [address] [place] [address] / [facility]    Non-G Provider     Transferring providers: SAMUEL William CNP, Amber Martin MD  Recent Hospitalization/ED:  Hospital  [facility] stay 5/9/25 to 5/13/25.  Date of SNF Admission: May 13, 2025  Date of SNF (anticipated) Discharge: July 03, 2025  Discharged to: previous assisted living  DME: Wheelchair    CODE STATUS/ADVANCE DIRECTIVES DISCUSSION:  No CPR- Pre-arrest intubation OK   ALLERGIES: Sulfamethoxazole-trimethoprim, Gluten meal, Oxybutynin, Sulfamethoxazole, Tolterodine, Trimethoprim, Ciprofloxacin, and Soap    NURSING FACILITY COURSE   Medication Changes/Rationale:   Oxycodone stopped, not needed    Summary of nursing facility stay:   Ophelia Spangler  is a 80 year old  (1945), admitted to the above facility from  [facility]. Hospital stay 5/9/25 through 5/13/25. Patient presented from her memory care unit after a fall, admitted with right femoral neck fracture. She underwent hemiarthroplasty.     Closed right hip fracture, sequela  Patient with very slow progress in therapy. She has not been able to return to her prior level of functioning. She can transfer and walk a few steps, but requires a wheelchair for mobility.     Severe dementia without behavioral disturbance, psychotic disturbance, mood disturbance, or anxiety, unspecified dementia type (H)  Patient refused all medications for a time, but has been taking them now. She had a fall recently, no injury. She is nonverbal, wanders in the wheelchair at times, occasional attempts to self  "transfer    Hypothyroidism, unspecified type  Patient missed several doses of levothyroxine when refusing to take meds. Would not recheck TSH for a number of weeks      Discharge Medications:  Current Outpatient Medications   Medication Sig Dispense Refill    acetaminophen (TYLENOL) 500 MG tablet Take 1,000 mg by mouth 3 times daily as needed for mild pain.      levothyroxine (SYNTHROID/LEVOTHROID) 137 MCG tablet Take 137 mcg by mouth every morning (before breakfast).          Controlled medications:   not applicable/none     Past Medical History: No past medical history on file.  Physical Exam:   Vitals: /50   Pulse 61   Temp 97.9  F (36.6  C)   Resp 18   Ht 1.702 m (5' 7\")   Wt 94.1 kg (207 lb 8 oz)   SpO2 94%   BMI 32.50 kg/m    BMI: Body mass index is 32.5 kg/m .  GENERAL APPEARANCE:  Alert, in no distress  EYES:  EOM normal, conjunctiva and lids normal  RESP:  no respiratory distress  SKIN:  Inspection of skin and subcutaneous tissue baseline  PSYCH:  nonverbal, affect flat     SNF labs: Labs done in SNF are in [facility]. Please refer to them using [facility] Everywhere.    DISCHARGE PLAN:  Follow up labs: No labs orders/due  Medical Follow Up:      Follow up with primary care provider in 2-3 weeks  Discharge Services: Home Care:  Occupational Therapy and Physical Therapy      TOTAL DISCHARGE TIME:   Greater than 30 minutes  Electronically signed by:  SAMUEL William CNP       Documentation of Face to Face and Certification for Home Health Services    I certify that services are/were furnished while this patient was under the care of a physician and that a physician or an allowed non-physician practitioner (NPP), had a face-to-face encounter that meets the physician face-to-face encounter requirements. The encounter was in whole, or in part, related to the primary reason for home health. The patient is confined to his/her home and needs intermittent skilled nursing, physical therapy, " speech-language pathology, or the continued need for occupational therapy. A plan of care has been established by a physician and is periodically reviewed by a physician.  Date of Face-to-Face Encounter: 7/1/2025.    I certify that, based on my findings, the following services are medically necessary home health services: Occupational Therapy and Physical Therapy.    My clinical findings support the need for the above skilled services because: Requires assistance of another person or specialized equipment to access medical services because patient: Is prone to wander/get lost without assistance. and Is unable to operate assistive equipment on their own...    Patient to re-establish plan of care with their PCP within 7-10 days after leaving the facility to reestablish care.  Medicare certified [facility] provider: SAMUEL William CNP  Date: July 1, 2025

## 2025-08-20 ENCOUNTER — HOSPITAL ENCOUNTER (EMERGENCY)
Facility: HOSPITAL | Age: 80
Discharge: NURSING, REHAB OR LONG-TERM CARE | End: 2025-08-20
Attending: EMERGENCY MEDICINE | Admitting: EMERGENCY MEDICINE
Payer: COMMERCIAL

## 2025-08-20 VITALS
HEART RATE: 77 BPM | OXYGEN SATURATION: 96 % | TEMPERATURE: 97.6 F | SYSTOLIC BLOOD PRESSURE: 140 MMHG | RESPIRATION RATE: 18 BRPM | DIASTOLIC BLOOD PRESSURE: 63 MMHG
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
## 2025-08-20 PROCEDURE — 99284 EMERGENCY DEPT VISIT MOD MDM: CPT | Mod: 25 | Performed by: EMERGENCY MEDICINE

## 2025-08-20 PROCEDURE — 250N000013 HC RX MED GY IP 250 OP 250 PS 637: Performed by: EMERGENCY MEDICINE

## 2025-08-20 RX ORDER — ACETAMINOPHEN 325 MG/1
975 TABLET ORAL ONCE
Status: COMPLETED | OUTPATIENT
Start: 2025-08-20 | End: 2025-08-20

## 2025-08-20 RX ADMIN — ACETAMINOPHEN 975 MG: 325 TABLET ORAL at 21:15

## 2025-08-20 ASSESSMENT — COLUMBIA-SUICIDE SEVERITY RATING SCALE - C-SSRS: IS THE PATIENT NOT ABLE TO COMPLETE C-SSRS: UNABLE TO VERBALIZE

## 2025-08-20 ASSESSMENT — ACTIVITIES OF DAILY LIVING (ADL)
ADLS_ACUITY_SCORE: 63
ADLS_ACUITY_SCORE: 63

## (undated) DEVICE — GLOVE BIOGEL PI INDICATOR 8.0 LF 41680

## (undated) DEVICE — SPONGE LAP 18X18" X8435

## (undated) DEVICE — DRSG STERI STRIP 1/2X4" R1547

## (undated) DEVICE — SU DERMABOND ADVANCED .7ML DNX12

## (undated) DEVICE — SU VICRYL+ 0 27IN CT-1 UND VCP260H

## (undated) DEVICE — SUTURE MONOCRYL 3-0 18 PS2 UND MCP497G

## (undated) DEVICE — SUTURE VICRYL+ 1 27IN CT-1 UND VCP261H

## (undated) DEVICE — HOLDER LIMB VELCRO OR 0814-1533

## (undated) DEVICE — SUTURE VICRYL+ 2-0 27IN CT-1 UND VCP259H

## (undated) DEVICE — BONE CLEANING TIP INTERPULSE  0210-010-000

## (undated) DEVICE — GLOVE BIOGEL PI ULTRATOUCH G SZ 8.0 42180

## (undated) DEVICE — ESU GROUND PAD ADULT REM W/15' CORD E7507DB

## (undated) DEVICE — DRAPE IOBAN INCISE 36X23" 6651EZ

## (undated) DEVICE — PULSE LAVAGE IRRIGATION SYSTEM 0210-114-100

## (undated) DEVICE — GLOVE UNDER INDICATOR PI SZ 7.0 LF 41670

## (undated) DEVICE — DRAPE CONVERTORS U-DRAPE 60X72" 8476

## (undated) DEVICE — BLADE SAW SAGITTAL STRK WIDE 25.4X85X1.2MM 2108-151-000

## (undated) DEVICE — A3 SUPPLIES- SEE NURSING INFO PAGE

## (undated) DEVICE — SOL NACL 0.9% IRRIG 3000ML BAG 2B7127

## (undated) DEVICE — CUSTOM PACK TOTAL HIP SOP5BTHHEA

## (undated) DEVICE — PACKING VAG 2 X 15 XRAY DETECT 32-1359

## (undated) DEVICE — GLOVE BIOGEL PI ULTRATOUCH G SZ 7.0 42170

## (undated) DEVICE — SOL WATER IRRIG 1000ML BOTTLE 2F7114

## (undated) DEVICE — DRESSING MEPILEX ADH 4INX10IN STRL LF 496455

## (undated) DEVICE — SU ETHIBOND 5 V-37 4X30" MB66G

## (undated) DEVICE — GOWN XLG DISP 9545

## (undated) DEVICE — BRUSH FEMORAL CANAL 210-4 0210004000

## (undated) DEVICE — POSITIONER ABDUCTION PILLOW FOAM MED APG 202

## (undated) DEVICE — SU STRATAFIX PDS PLUS 1 CT-1 18" SXPP1A404

## (undated) DEVICE — CEMENT PRESSURIZER FEMORAL CANAL SM

## (undated) DEVICE — SUCTION MANIFOLD NEPTUNE 2 SYS 4 PORT 0702-020-000

## (undated) DEVICE — PAD HIP POSITIONING MCGUIRE 707-CPM

## (undated) DEVICE — SU ETHIBOND 1 CT-1 30" X425H

## (undated) RX ORDER — FENTANYL CITRATE 50 UG/ML
INJECTION, SOLUTION INTRAMUSCULAR; INTRAVENOUS
Status: DISPENSED
Start: 2025-05-10

## (undated) RX ORDER — PROPOFOL 10 MG/ML
INJECTION, EMULSION INTRAVENOUS
Status: DISPENSED
Start: 2025-05-10